# Patient Record
Sex: MALE | Race: WHITE | NOT HISPANIC OR LATINO | Employment: FULL TIME | URBAN - METROPOLITAN AREA
[De-identification: names, ages, dates, MRNs, and addresses within clinical notes are randomized per-mention and may not be internally consistent; named-entity substitution may affect disease eponyms.]

---

## 2017-09-12 ENCOUNTER — HOSPITAL ENCOUNTER (OUTPATIENT)
Facility: MEDICAL CENTER | Age: 57
End: 2017-09-14
Attending: EMERGENCY MEDICINE | Admitting: INTERNAL MEDICINE
Payer: COMMERCIAL

## 2017-09-12 ENCOUNTER — APPOINTMENT (OUTPATIENT)
Dept: RADIOLOGY | Facility: MEDICAL CENTER | Age: 57
End: 2017-09-12
Attending: EMERGENCY MEDICINE
Payer: COMMERCIAL

## 2017-09-12 ENCOUNTER — APPOINTMENT (OUTPATIENT)
Dept: RADIOLOGY | Facility: MEDICAL CENTER | Age: 57
End: 2017-09-12
Attending: INTERNAL MEDICINE
Payer: COMMERCIAL

## 2017-09-12 ENCOUNTER — RESOLUTE PROFESSIONAL BILLING HOSPITAL PROF FEE (OUTPATIENT)
Dept: HOSPITALIST | Facility: MEDICAL CENTER | Age: 57
End: 2017-09-12
Payer: COMMERCIAL

## 2017-09-12 PROBLEM — R07.9 CHEST PAIN: Status: ACTIVE | Noted: 2017-09-12

## 2017-09-12 LAB
ALBUMIN SERPL BCP-MCNC: 4 G/DL (ref 3.2–4.9)
ALBUMIN/GLOB SERPL: 1.6 G/DL
ALP SERPL-CCNC: 52 U/L (ref 30–99)
ALT SERPL-CCNC: 15 U/L (ref 2–50)
ANION GAP SERPL CALC-SCNC: 8 MMOL/L (ref 0–11.9)
APPEARANCE UR: CLEAR
AST SERPL-CCNC: 20 U/L (ref 12–45)
BASOPHILS # BLD AUTO: 0.4 % (ref 0–1.8)
BASOPHILS # BLD: 0.04 K/UL (ref 0–0.12)
BILIRUB SERPL-MCNC: 0.7 MG/DL (ref 0.1–1.5)
BILIRUB UR QL STRIP.AUTO: NEGATIVE
BUN SERPL-MCNC: 13 MG/DL (ref 8–22)
CALCIUM SERPL-MCNC: 9.4 MG/DL (ref 8.5–10.5)
CHLORIDE SERPL-SCNC: 106 MMOL/L (ref 96–112)
CO2 SERPL-SCNC: 24 MMOL/L (ref 20–33)
COLOR UR: YELLOW
CREAT SERPL-MCNC: 0.74 MG/DL (ref 0.5–1.4)
CULTURE IF INDICATED INDCX: NO UA CULTURE
EKG IMPRESSION: NORMAL
EOSINOPHIL # BLD AUTO: 0.01 K/UL (ref 0–0.51)
EOSINOPHIL NFR BLD: 0.1 % (ref 0–6.9)
ERYTHROCYTE [DISTWIDTH] IN BLOOD BY AUTOMATED COUNT: 41.3 FL (ref 35.9–50)
GFR SERPL CREATININE-BSD FRML MDRD: >60 ML/MIN/1.73 M 2
GLOBULIN SER CALC-MCNC: 2.5 G/DL (ref 1.9–3.5)
GLUCOSE SERPL-MCNC: 101 MG/DL (ref 65–99)
GLUCOSE UR STRIP.AUTO-MCNC: NEGATIVE MG/DL
HCT VFR BLD AUTO: 47.1 % (ref 42–52)
HGB BLD-MCNC: 15.8 G/DL (ref 14–18)
IMM GRANULOCYTES # BLD AUTO: 0.02 K/UL (ref 0–0.11)
IMM GRANULOCYTES NFR BLD AUTO: 0.2 % (ref 0–0.9)
KETONES UR STRIP.AUTO-MCNC: 40 MG/DL
LEUKOCYTE ESTERASE UR QL STRIP.AUTO: NEGATIVE
LIPASE SERPL-CCNC: 15 U/L (ref 11–82)
LYMPHOCYTES # BLD AUTO: 1.93 K/UL (ref 1–4.8)
LYMPHOCYTES NFR BLD: 17.6 % (ref 22–41)
MCH RBC QN AUTO: 29.8 PG (ref 27–33)
MCHC RBC AUTO-ENTMCNC: 33.5 G/DL (ref 33.7–35.3)
MCV RBC AUTO: 88.9 FL (ref 81.4–97.8)
MICRO URNS: ABNORMAL
MONOCYTES # BLD AUTO: 0.75 K/UL (ref 0–0.85)
MONOCYTES NFR BLD AUTO: 6.8 % (ref 0–13.4)
NEUTROPHILS # BLD AUTO: 8.2 K/UL (ref 1.82–7.42)
NEUTROPHILS NFR BLD: 74.9 % (ref 44–72)
NITRITE UR QL STRIP.AUTO: NEGATIVE
NRBC # BLD AUTO: 0 K/UL
NRBC BLD AUTO-RTO: 0 /100 WBC
PH UR STRIP.AUTO: 6 [PH]
PLATELET # BLD AUTO: 268 K/UL (ref 164–446)
PMV BLD AUTO: 10.8 FL (ref 9–12.9)
POTASSIUM SERPL-SCNC: 3.6 MMOL/L (ref 3.6–5.5)
PROT SERPL-MCNC: 6.5 G/DL (ref 6–8.2)
PROT UR QL STRIP: NEGATIVE MG/DL
RBC # BLD AUTO: 5.3 M/UL (ref 4.7–6.1)
RBC UR QL AUTO: NEGATIVE
SODIUM SERPL-SCNC: 138 MMOL/L (ref 135–145)
SP GR UR STRIP.AUTO: 1.02
TROPONIN I SERPL-MCNC: 0.01 NG/ML (ref 0–0.04)
TROPONIN I SERPL-MCNC: 0.02 NG/ML (ref 0–0.04)
UROBILINOGEN UR STRIP.AUTO-MCNC: 0.2 MG/DL
WBC # BLD AUTO: 11 K/UL (ref 4.8–10.8)

## 2017-09-12 PROCEDURE — 93005 ELECTROCARDIOGRAM TRACING: CPT | Performed by: INTERNAL MEDICINE

## 2017-09-12 PROCEDURE — 99407 BEHAV CHNG SMOKING > 10 MIN: CPT | Performed by: INTERNAL MEDICINE

## 2017-09-12 PROCEDURE — A9270 NON-COVERED ITEM OR SERVICE: HCPCS | Performed by: EMERGENCY MEDICINE

## 2017-09-12 PROCEDURE — 700111 HCHG RX REV CODE 636 W/ 250 OVERRIDE (IP): Performed by: EMERGENCY MEDICINE

## 2017-09-12 PROCEDURE — 93005 ELECTROCARDIOGRAM TRACING: CPT

## 2017-09-12 PROCEDURE — 81003 URINALYSIS AUTO W/O SCOPE: CPT

## 2017-09-12 PROCEDURE — 99218 PR INITIAL OBSERVATION CARE,LEVL I: CPT | Mod: 25 | Performed by: INTERNAL MEDICINE

## 2017-09-12 PROCEDURE — 96374 THER/PROPH/DIAG INJ IV PUSH: CPT

## 2017-09-12 PROCEDURE — 83690 ASSAY OF LIPASE: CPT

## 2017-09-12 PROCEDURE — 700102 HCHG RX REV CODE 250 W/ 637 OVERRIDE(OP): Performed by: INTERNAL MEDICINE

## 2017-09-12 PROCEDURE — 36415 COLL VENOUS BLD VENIPUNCTURE: CPT

## 2017-09-12 PROCEDURE — G0378 HOSPITAL OBSERVATION PER HR: HCPCS

## 2017-09-12 PROCEDURE — 93010 ELECTROCARDIOGRAM REPORT: CPT | Performed by: INTERNAL MEDICINE

## 2017-09-12 PROCEDURE — 96375 TX/PRO/DX INJ NEW DRUG ADDON: CPT

## 2017-09-12 PROCEDURE — A9270 NON-COVERED ITEM OR SERVICE: HCPCS | Performed by: INTERNAL MEDICINE

## 2017-09-12 PROCEDURE — 99285 EMERGENCY DEPT VISIT HI MDM: CPT

## 2017-09-12 PROCEDURE — 93005 ELECTROCARDIOGRAM TRACING: CPT | Performed by: EMERGENCY MEDICINE

## 2017-09-12 PROCEDURE — 74177 CT ABD & PELVIS W/CONTRAST: CPT

## 2017-09-12 PROCEDURE — 71020 DX-CHEST-2 VIEWS: CPT

## 2017-09-12 PROCEDURE — 700102 HCHG RX REV CODE 250 W/ 637 OVERRIDE(OP): Performed by: EMERGENCY MEDICINE

## 2017-09-12 PROCEDURE — 85025 COMPLETE CBC W/AUTO DIFF WBC: CPT

## 2017-09-12 PROCEDURE — 84484 ASSAY OF TROPONIN QUANT: CPT

## 2017-09-12 PROCEDURE — 80053 COMPREHEN METABOLIC PANEL: CPT

## 2017-09-12 RX ORDER — NICOTINE 21 MG/24HR
21 PATCH, TRANSDERMAL 24 HOURS TRANSDERMAL
Status: DISCONTINUED | OUTPATIENT
Start: 2017-09-13 | End: 2017-09-13

## 2017-09-12 RX ORDER — POLYETHYLENE GLYCOL 3350 17 G/17G
1 POWDER, FOR SOLUTION ORAL
Status: DISCONTINUED | OUTPATIENT
Start: 2017-09-12 | End: 2017-09-14 | Stop reason: HOSPADM

## 2017-09-12 RX ORDER — ASPIRIN 81 MG/1
324 TABLET, CHEWABLE ORAL ONCE
Status: COMPLETED | OUTPATIENT
Start: 2017-09-12 | End: 2017-09-12

## 2017-09-12 RX ORDER — PROMETHAZINE HYDROCHLORIDE 25 MG/1
12.5-25 TABLET ORAL EVERY 4 HOURS PRN
Status: DISCONTINUED | OUTPATIENT
Start: 2017-09-12 | End: 2017-09-14 | Stop reason: HOSPADM

## 2017-09-12 RX ORDER — PROMETHAZINE HYDROCHLORIDE 25 MG/1
12.5-25 SUPPOSITORY RECTAL EVERY 4 HOURS PRN
Status: DISCONTINUED | OUTPATIENT
Start: 2017-09-12 | End: 2017-09-14 | Stop reason: HOSPADM

## 2017-09-12 RX ORDER — MORPHINE SULFATE 4 MG/ML
2 INJECTION, SOLUTION INTRAMUSCULAR; INTRAVENOUS
Status: DISCONTINUED | OUTPATIENT
Start: 2017-09-12 | End: 2017-09-14 | Stop reason: HOSPADM

## 2017-09-12 RX ORDER — OXYCODONE HYDROCHLORIDE 5 MG/1
5 TABLET ORAL
Status: DISCONTINUED | OUTPATIENT
Start: 2017-09-12 | End: 2017-09-14 | Stop reason: HOSPADM

## 2017-09-12 RX ORDER — ONDANSETRON 2 MG/ML
4 INJECTION INTRAMUSCULAR; INTRAVENOUS EVERY 4 HOURS PRN
Status: DISCONTINUED | OUTPATIENT
Start: 2017-09-12 | End: 2017-09-14 | Stop reason: HOSPADM

## 2017-09-12 RX ORDER — AMOXICILLIN 250 MG
2 CAPSULE ORAL 2 TIMES DAILY
Status: DISCONTINUED | OUTPATIENT
Start: 2017-09-13 | End: 2017-09-14 | Stop reason: HOSPADM

## 2017-09-12 RX ORDER — BISACODYL 10 MG
10 SUPPOSITORY, RECTAL RECTAL
Status: DISCONTINUED | OUTPATIENT
Start: 2017-09-12 | End: 2017-09-14 | Stop reason: HOSPADM

## 2017-09-12 RX ORDER — ASPIRIN 300 MG/1
300 SUPPOSITORY RECTAL DAILY
Status: DISCONTINUED | OUTPATIENT
Start: 2017-09-13 | End: 2017-09-14 | Stop reason: HOSPADM

## 2017-09-12 RX ORDER — OMEPRAZOLE 20 MG/1
20 CAPSULE, DELAYED RELEASE ORAL DAILY
Status: DISCONTINUED | OUTPATIENT
Start: 2017-09-13 | End: 2017-09-14 | Stop reason: HOSPADM

## 2017-09-12 RX ORDER — DEXTROSE MONOHYDRATE 25 G/50ML
25 INJECTION, SOLUTION INTRAVENOUS
Status: DISCONTINUED | OUTPATIENT
Start: 2017-09-12 | End: 2017-09-14 | Stop reason: HOSPADM

## 2017-09-12 RX ORDER — MORPHINE SULFATE 4 MG/ML
4 INJECTION, SOLUTION INTRAMUSCULAR; INTRAVENOUS ONCE
Status: COMPLETED | OUTPATIENT
Start: 2017-09-12 | End: 2017-09-12

## 2017-09-12 RX ORDER — ACETAMINOPHEN 325 MG/1
650 TABLET ORAL EVERY 6 HOURS PRN
Status: DISCONTINUED | OUTPATIENT
Start: 2017-09-12 | End: 2017-09-14 | Stop reason: HOSPADM

## 2017-09-12 RX ORDER — OXYCODONE HYDROCHLORIDE 5 MG/1
2.5 TABLET ORAL
Status: DISCONTINUED | OUTPATIENT
Start: 2017-09-12 | End: 2017-09-14 | Stop reason: HOSPADM

## 2017-09-12 RX ORDER — ASPIRIN 325 MG
325 TABLET ORAL DAILY
Status: DISCONTINUED | OUTPATIENT
Start: 2017-09-13 | End: 2017-09-14 | Stop reason: HOSPADM

## 2017-09-12 RX ORDER — ONDANSETRON 4 MG/1
4 TABLET, ORALLY DISINTEGRATING ORAL EVERY 4 HOURS PRN
Status: DISCONTINUED | OUTPATIENT
Start: 2017-09-12 | End: 2017-09-14 | Stop reason: HOSPADM

## 2017-09-12 RX ORDER — ASPIRIN 81 MG/1
324 TABLET, CHEWABLE ORAL DAILY
Status: DISCONTINUED | OUTPATIENT
Start: 2017-09-13 | End: 2017-09-14 | Stop reason: HOSPADM

## 2017-09-12 RX ORDER — ONDANSETRON 2 MG/ML
4 INJECTION INTRAMUSCULAR; INTRAVENOUS ONCE
Status: COMPLETED | OUTPATIENT
Start: 2017-09-12 | End: 2017-09-12

## 2017-09-12 RX ADMIN — ONDANSETRON 4 MG: 2 INJECTION INTRAMUSCULAR; INTRAVENOUS at 16:39

## 2017-09-12 RX ADMIN — LIDOCAINE HYDROCHLORIDE 30 ML: 20 SOLUTION OROPHARYNGEAL at 21:54

## 2017-09-12 RX ADMIN — MORPHINE SULFATE 4 MG: 4 INJECTION INTRAVENOUS at 16:38

## 2017-09-12 RX ADMIN — ASPIRIN 324 MG: 81 TABLET, CHEWABLE ORAL at 16:38

## 2017-09-12 ASSESSMENT — LIFESTYLE VARIABLES
TOTAL SCORE: 0
TOTAL SCORE: 0
REASON UNABLE TO ASSESS: 0
CONSUMPTION TOTAL: INCOMPLETE
EVER FELT BAD OR GUILTY ABOUT YOUR DRINKING: NO
TOTAL SCORE: 0
TOTAL SCORE: 0
DO YOU DRINK ALCOHOL: YES
HAVE PEOPLE ANNOYED YOU BY CRITICIZING YOUR DRINKING: NO
TOTAL SCORE: 0
EVER HAD A DRINK FIRST THING IN THE MORNING TO STEADY YOUR NERVES TO GET RID OF A HANGOVER: NO
DO YOU DRINK ALCOHOL: NO
HAVE PEOPLE ANNOYED YOU BY CRITICIZING YOUR DRINKING: NO
EVER FELT BAD OR GUILTY ABOUT YOUR DRINKING: NO
EVER HAD A DRINK FIRST THING IN THE MORNING TO STEADY YOUR NERVES TO GET RID OF A HANGOVER: NO
HAVE YOU EVER FELT YOU SHOULD CUT DOWN ON YOUR DRINKING: NO
CONSUMPTION TOTAL: INCOMPLETE
TOTAL SCORE: 0
HAVE YOU EVER FELT YOU SHOULD CUT DOWN ON YOUR DRINKING: NO

## 2017-09-12 ASSESSMENT — PATIENT HEALTH QUESTIONNAIRE - PHQ9
SUM OF ALL RESPONSES TO PHQ QUESTIONS 1-9: 0
1. LITTLE INTEREST OR PLEASURE IN DOING THINGS: NOT AT ALL
2. FEELING DOWN, DEPRESSED, IRRITABLE, OR HOPELESS: NOT AT ALL
SUM OF ALL RESPONSES TO PHQ9 QUESTIONS 1 AND 2: 0

## 2017-09-12 ASSESSMENT — PAIN SCALES - GENERAL
PAINLEVEL_OUTOF10: 0
PAINLEVEL_OUTOF10: 0
PAINLEVEL_OUTOF10: 8
PAINLEVEL_OUTOF10: 0

## 2017-09-12 ASSESSMENT — PAIN SCALES - WONG BAKER
WONGBAKER_NUMERICALRESPONSE: DOESN'T HURT AT ALL

## 2017-09-12 NOTE — ED PROVIDER NOTES
ED Provider Note    Scribed for Bean Mckeon M.D. by Brunilda Fair. 9/12/2017, 4:13 PM.    Primary care provider: None reported.  Means of arrival: walk-in  History obtained from: Patient  History limited by: none     CHIEF COMPLAINT  Chief Complaint   Patient presents with   • Chest Pain     Developed pain while in the lobby, no cardiac history, ekg done in triage, non radiating, epigastric   • Abdominal Pain     X3 days, hx prostatitis, diffuse in nature     HPI  Bernardo Pickering is a 56 y.o. male who presents to the Emergency Department primarily for evaluation of groin pain onset three days ago. Pain is located to left side of groin. Patient reports history of prostatitis and hernia repair on his left side. Patient states he finished antibiotics for prostatitis four week ago. He states associated chest pain, indigestion, loss of appetite, tongue swelling, and headache at this time. He describes his chest pain as burning and intermittent in nature. Pain is located to the center of chest. Patient also describes headache is throbbing in nature. Denies fever, cough, sore throat, dysuria, or vomiting. Denies exacerbating factors of chest pain. Patient reports history of emphysema. He smokes a pack of cigarettes per day. Denies primary care at this time.     REVIEW OF SYSTEMS  Patient reports groin pain, chest pain, indigestion, loss of appetite, tongue swelling, and headache.  He denies fever, vision change, sore throat, cough, dysuria, vomiting, focal muscle pain, or neurologic deficits. C    PAST MEDICAL HISTORY   Patient reports history of prostatis    SURGICAL HISTORY  patient denies any surgical history    SOCIAL HISTORY  Social History   Substance Use Topics   • Smoking status: Current Every Day Smoker     Packs/day: 1.00     Types: Cigarettes     Start date: 9/12/1997   • Alcohol use Yes      History   Drug Use No     FAMILY HISTORY  Patient does not report pertinent family history     CURRENT  "MEDICATIONS  Reviewed. See Encounter Summary.     ALLERGIES  Allergies   Allergen Reactions   • Prednisone    • Sulfa Drugs      PHYSICAL EXAM  VITAL SIGNS: /75   Pulse 96   Temp 37.4 °C (99.3 °F)   Resp 16   Ht 1.753 m (5' 9\")   Wt 72.7 kg (160 lb 4.4 oz)   SpO2 95%   BMI 23.67 kg/m²    Pulse ox interpretation: I interpret this pulse ox as normal.  Constitutional: Alert in no apparent distress.  HENT: Head normocephalic, atraumatic, Bilateral external ears normal, Nose normal.  Eyes: Pupils are equal, extraocular movements intact, Conjunctiva normal, Non-icteric.   Neck: Normal range of motion, Supple, No stridor.   Lymphatic: No lymphadenopathy noted.   Cardiovascular: Regular rate and systolic murmur grade 3/5   Thorax & Lungs: No acute respiratory distress, No wheezing, No increased work of breathing.   Abdomen: Soft, prominent descending aorta, right upper quadrant tenderness to palpation with mild guarding, Non-distended, No peritoneal signs.  Skin: Warm, Dry, No erythema, No rash.   Back: No bony tenderness, No CVA tenderness.   Extremities: Intact distal pulses, No edema, No tenderness, No cyanosis.    Musculoskeletal: Good range of motion in all major joints. No tenderness to palpation or major deformities noted.   Neurologic: Alert , Normal motor function, Normal sensory function, No focal deficits noted.   Psychiatric: Affect normal, Judgment normal, Mood normal.     DIAGNOSTIC STUDIES / PROCEDURES     LABS  Results for orders placed or performed during the hospital encounter of 09/12/17   COMP METABOLIC PANEL   Result Value Ref Range    Sodium 138 135 - 145 mmol/L    Potassium 3.6 3.6 - 5.5 mmol/L    Chloride 106 96 - 112 mmol/L    Co2 24 20 - 33 mmol/L    Anion Gap 8.0 0.0 - 11.9    Glucose 101 (H) 65 - 99 mg/dL    Bun 13 8 - 22 mg/dL    Creatinine 0.74 0.50 - 1.40 mg/dL    Calcium 9.4 8.5 - 10.5 mg/dL    AST(SGOT) 20 12 - 45 U/L    ALT(SGPT) 15 2 - 50 U/L    Alkaline Phosphatase 52 30 - 99 " U/L    Total Bilirubin 0.7 0.1 - 1.5 mg/dL    Albumin 4.0 3.2 - 4.9 g/dL    Total Protein 6.5 6.0 - 8.2 g/dL    Globulin 2.5 1.9 - 3.5 g/dL    A-G Ratio 1.6 g/dL   LIPASE   Result Value Ref Range    Lipase 15 11 - 82 U/L   ESTIMATED GFR   Result Value Ref Range    GFR If African American >60 >60 mL/min/1.73 m 2    GFR If Non African American >60 >60 mL/min/1.73 m 2   EKG (ER)   Result Value Ref Range    Report       [facility] Emergency Dept.    Test Date:  2017  Pt Name:    YOUNG GAYLE                  Department: ER  MRN:        [id number]                      Room:  Gender:     M                            Technician: [id number]  :        1960                   Requested By:ER TRIAGE PROTOCOL  Order #:    [id number]                    Reading MD:    Measurements  Intervals                                Axis  Rate:       91                           P:          71  LA:         148                          QRS:        6  QRSD:       98                           T:          47  QT:         364  QTc:        448    Interpretive Statements  SINUS TACHYCARDIA  MULTIPLE ATRIAL PREMATURE COMPLEXES  LEFT ATRIAL ABNORMALITY  LEFT VENTRICULAR HYPERTROPHY  ST DEPRESSION, CONSIDER ISCHEMIA, ANT-LAT LDS  No previous ECG available for comparison     EKG (ER)   Result Value Ref Range    Report       [facility] Emergency Dept.    Test Date:  2017  Pt Name:    YOUNG GAYLE                  Department: ER  MRN:        [id number]                      Room:        34  Gender:     M                            Technician: [id number]  :        1960                   Requested By:CHERYL BLAKE  Order #:    [id number]                    Reading MD:    Measurements  Intervals                                Axis  Rate:       86                           P:          70  LA:         152                          QRS:        33  QRSD:       96                           T:          64  QT:          372  QTc:        445    Interpretive Statements  SINUS RHYTHM  MULTIPLE ATRIAL PREMATURE COMPLEXES  LEFT ATRIAL ABNORMALITY  RSR' IN V1 OR V2, PROBABLY NORMAL VARIANT  LEFT VENTRICULAR HYPERTROPHY  ST DEPRESSION, CONSIDER ISCHEMIA, LAT LEADS  Compared to ECG 09/12/2017 15:55:06  RSR' in V1 or V2 now present  Sinus tachycardia no longer present  ST (T wave) deviation still pr esent  Possible ischemia still present       All labs were reviewed by me.    EKG  12 Lead EKG interpreted by me to show:  Normal sinus rhythm  Rate 86  Multiple Atrial complexes   borderline ST segment depression v4- v6  Impression: Multiple PAC's with ST segment depression in lateral leads      RADIOLOGY  CT-ABDOMEN-PELVIS WITH   Final Result      Moderate colonic diverticulosis without evidence of diverticulitis      Cholecystectomy without biliary dilatation      Left renal and hepatic cysts      NM-CARDIAC STRESS TEST    (Results Pending)   DX-CHEST-2 VIEWS    (Results Pending)     The radiologist's interpretation of all radiological studies have been reviewed by me.    COURSE & MEDICAL DECISION MAKING  Pertinent Labs & Imaging studies reviewed. (See chart for details)    4:13 PM - Patient seen and examined at bedside. Patient is here with right upper quadrant tenderness. No peritoneal signs. Patient will be treated with Zofran injection 4 mg, Morphine 4 mg/ml, and Aspirin 324 mg. Ordered CT-Abdomen-Pelvis, CBC with differential, CMP, Troponin, Lipase, Urinalysis, EKG to evaluate his symptoms. Eunice hospitalist.     5:58 PM I discussed the patient's case and the above findings with Dr. Saunders (hospitalist) who agrees to admit patient.     Decision Making:  This is a 56 y.o. year old male who presents with intermittent chest pain, and abdominal pain. The patient states that he was diagnosed with prostatitis in the past month, states that he finished antibiotics approximately 2 weeks ago, and his continued have some similar type  symptoms. He does describe some pain with defecation, and in addition to this, ongoing abdominal pain, and nausea. The patient states that he's been having chest pain, and here on examination the patient has a fairly normal exam but considerations included diverticulitis, prostatitis, acute coronary syndrome, and costochondritis. Laboratory analysis here shows normal troponins, and EKG shows no ST segment elevation but does have some depressions in the lateral leads. The patient's story is of only moderate suggestion of acute MI however the patient's EKG does have some concerning findings. The patient does not have a previous one to compare to, and given the patient's EKG abnormalities, I think he would likely benefit from admission for further troponin turning, and provocative testing. Regarding the patient's abdominal pain, CT scan here shows no evidence of significant intra-abdominal abnormality. Additionally, patient has normal laboratory analyses here, and showing no evidence of left foot abnormalities or other evidence of end organ dysfunction. Given this, platelet admit the patient for further troponin trending and provocative testing on an inpatient basis    DISPOSITION:  Patient will be admitted to Dr Saunders in guarded condition.    FINAL IMPRESSION  1. Chest pain, other  2. Intermittent abdominal pain, generalized      Brunilda RENO (Scribe), am scribing for, and in the presence of, Bean Mckeon M.D..    Electronically signed by: Brunilda Fair (Scribe), 9/12/2017    Bean RENO M.D. personally performed the services described in this documentation, as scribed by Brunilda Fair in my presence, and it is both accurate and complete.    The note accurately reflects work and decisions made by me.  Bean Mckeon  9/12/2017  11:09 PM

## 2017-09-12 NOTE — ED NOTES
Bernardo Pickering  56 y.o.  Chief Complaint   Patient presents with   • Chest Pain     Developed pain while in the lobby, no cardiac history, ekg done in triage, non radiating, epigastric   • Abdominal Pain     X3 days, hx prostatitis, diffuse in nature     ERP at bedside, second ekg done

## 2017-09-13 ENCOUNTER — APPOINTMENT (OUTPATIENT)
Dept: RADIOLOGY | Facility: MEDICAL CENTER | Age: 57
End: 2017-09-13
Attending: INTERNAL MEDICINE
Payer: COMMERCIAL

## 2017-09-13 PROBLEM — Z72.0 TOBACCO ABUSE: Chronic | Status: ACTIVE | Noted: 2017-09-13

## 2017-09-13 PROBLEM — R07.9 CHEST PAIN: Status: RESOLVED | Noted: 2017-09-12 | Resolved: 2017-09-13

## 2017-09-13 PROBLEM — Z87.438 HISTORY OF PROSTATITIS: Status: RESOLVED | Noted: 2017-09-13 | Resolved: 2017-09-13

## 2017-09-13 PROBLEM — J96.01 ACUTE RESPIRATORY FAILURE WITH HYPOXIA (HCC): Status: ACTIVE | Noted: 2017-09-13

## 2017-09-13 PROBLEM — R00.1 SINUS BRADYCARDIA: Status: ACTIVE | Noted: 2017-09-13

## 2017-09-13 PROBLEM — J43.9 EMPHYSEMA OF LUNG (HCC): Chronic | Status: ACTIVE | Noted: 2017-09-13

## 2017-09-13 PROBLEM — N41.9 PROSTATITIS: Status: ACTIVE | Noted: 2017-09-13

## 2017-09-13 PROBLEM — R10.32 LEFT LOWER QUADRANT PAIN: Status: ACTIVE | Noted: 2017-09-13

## 2017-09-13 PROBLEM — R10.32 LEFT LOWER QUADRANT PAIN: Status: RESOLVED | Noted: 2017-09-13 | Resolved: 2017-09-13

## 2017-09-13 PROBLEM — I34.1 MITRAL VALVE PROLAPSE: Status: ACTIVE | Noted: 2017-09-13

## 2017-09-13 PROBLEM — Z72.0 TOBACCO ABUSE: Status: ACTIVE | Noted: 2017-09-13

## 2017-09-13 PROBLEM — J96.01 ACUTE RESPIRATORY FAILURE WITH HYPOXIA (HCC): Status: RESOLVED | Noted: 2017-09-13 | Resolved: 2017-09-13

## 2017-09-13 PROBLEM — Z87.438 HISTORY OF PROSTATITIS: Status: ACTIVE | Noted: 2017-09-13

## 2017-09-13 PROBLEM — J43.9 EMPHYSEMA OF LUNG (HCC): Status: ACTIVE | Noted: 2017-09-13

## 2017-09-13 LAB
ANION GAP SERPL CALC-SCNC: 5 MMOL/L (ref 0–11.9)
BASOPHILS # BLD AUTO: 0.3 % (ref 0–1.8)
BASOPHILS # BLD: 0.03 K/UL (ref 0–0.12)
BUN SERPL-MCNC: 14 MG/DL (ref 8–22)
CALCIUM SERPL-MCNC: 9 MG/DL (ref 8.5–10.5)
CHLORIDE SERPL-SCNC: 108 MMOL/L (ref 96–112)
CO2 SERPL-SCNC: 27 MMOL/L (ref 20–33)
CREAT SERPL-MCNC: 0.89 MG/DL (ref 0.5–1.4)
EKG IMPRESSION: NORMAL
EKG IMPRESSION: NORMAL
EOSINOPHIL # BLD AUTO: 0.05 K/UL (ref 0–0.51)
EOSINOPHIL NFR BLD: 0.5 % (ref 0–6.9)
ERYTHROCYTE [DISTWIDTH] IN BLOOD BY AUTOMATED COUNT: 42.1 FL (ref 35.9–50)
GFR SERPL CREATININE-BSD FRML MDRD: >60 ML/MIN/1.73 M 2
GLUCOSE SERPL-MCNC: 104 MG/DL (ref 65–99)
HCT VFR BLD AUTO: 43.6 % (ref 42–52)
HGB BLD-MCNC: 14.6 G/DL (ref 14–18)
IMM GRANULOCYTES # BLD AUTO: 0.03 K/UL (ref 0–0.11)
IMM GRANULOCYTES NFR BLD AUTO: 0.3 % (ref 0–0.9)
LV EJECT FRACT  99904: 75
LV EJECT FRACT MOD 2C 99903: 88.75
LV EJECT FRACT MOD 4C 99902: 81.27
LV EJECT FRACT MOD BP 99901: 82.56
LYMPHOCYTES # BLD AUTO: 1.99 K/UL (ref 1–4.8)
LYMPHOCYTES NFR BLD: 21.4 % (ref 22–41)
MCH RBC QN AUTO: 30.5 PG (ref 27–33)
MCHC RBC AUTO-ENTMCNC: 33.5 G/DL (ref 33.7–35.3)
MCV RBC AUTO: 91 FL (ref 81.4–97.8)
MONOCYTES # BLD AUTO: 0.68 K/UL (ref 0–0.85)
MONOCYTES NFR BLD AUTO: 7.3 % (ref 0–13.4)
NEUTROPHILS # BLD AUTO: 6.54 K/UL (ref 1.82–7.42)
NEUTROPHILS NFR BLD: 70.2 % (ref 44–72)
NRBC # BLD AUTO: 0 K/UL
NRBC BLD AUTO-RTO: 0 /100 WBC
PLATELET # BLD AUTO: 227 K/UL (ref 164–446)
PMV BLD AUTO: 10.7 FL (ref 9–12.9)
POTASSIUM SERPL-SCNC: 3.8 MMOL/L (ref 3.6–5.5)
RBC # BLD AUTO: 4.79 M/UL (ref 4.7–6.1)
SODIUM SERPL-SCNC: 140 MMOL/L (ref 135–145)
TROPONIN I SERPL-MCNC: 0.01 NG/ML (ref 0–0.04)
WBC # BLD AUTO: 9.3 K/UL (ref 4.8–10.8)

## 2017-09-13 PROCEDURE — 700102 HCHG RX REV CODE 250 W/ 637 OVERRIDE(OP): Performed by: NURSE PRACTITIONER

## 2017-09-13 PROCEDURE — 93306 TTE W/DOPPLER COMPLETE: CPT | Mod: 26 | Performed by: INTERNAL MEDICINE

## 2017-09-13 PROCEDURE — 700102 HCHG RX REV CODE 250 W/ 637 OVERRIDE(OP): Performed by: INTERNAL MEDICINE

## 2017-09-13 PROCEDURE — 700111 HCHG RX REV CODE 636 W/ 250 OVERRIDE (IP): Performed by: INTERNAL MEDICINE

## 2017-09-13 PROCEDURE — 700111 HCHG RX REV CODE 636 W/ 250 OVERRIDE (IP)

## 2017-09-13 PROCEDURE — 700105 HCHG RX REV CODE 258: Performed by: INTERNAL MEDICINE

## 2017-09-13 PROCEDURE — G0378 HOSPITAL OBSERVATION PER HR: HCPCS

## 2017-09-13 PROCEDURE — A9502 TC99M TETROFOSMIN: HCPCS

## 2017-09-13 PROCEDURE — A9270 NON-COVERED ITEM OR SERVICE: HCPCS | Performed by: NURSE PRACTITIONER

## 2017-09-13 PROCEDURE — A9270 NON-COVERED ITEM OR SERVICE: HCPCS | Performed by: INTERNAL MEDICINE

## 2017-09-13 PROCEDURE — 93005 ELECTROCARDIOGRAM TRACING: CPT | Performed by: INTERNAL MEDICINE

## 2017-09-13 PROCEDURE — 85025 COMPLETE CBC W/AUTO DIFF WBC: CPT

## 2017-09-13 PROCEDURE — 93306 TTE W/DOPPLER COMPLETE: CPT

## 2017-09-13 PROCEDURE — 80048 BASIC METABOLIC PNL TOTAL CA: CPT

## 2017-09-13 PROCEDURE — 96376 TX/PRO/DX INJ SAME DRUG ADON: CPT

## 2017-09-13 PROCEDURE — 84484 ASSAY OF TROPONIN QUANT: CPT

## 2017-09-13 PROCEDURE — 93010 ELECTROCARDIOGRAM REPORT: CPT | Performed by: INTERNAL MEDICINE

## 2017-09-13 PROCEDURE — 87086 URINE CULTURE/COLONY COUNT: CPT

## 2017-09-13 PROCEDURE — 36415 COLL VENOUS BLD VENIPUNCTURE: CPT

## 2017-09-13 PROCEDURE — 99225 PR SUBSEQUENT OBSERVATION CARE,LEVEL II: CPT | Performed by: HOSPITALIST

## 2017-09-13 RX ORDER — CALCIUM CARBONATE 500 MG/1
500 TABLET, CHEWABLE ORAL
Status: DISCONTINUED | OUTPATIENT
Start: 2017-09-13 | End: 2017-09-13

## 2017-09-13 RX ORDER — CIPROFLOXACIN 500 MG/1
500 TABLET, FILM COATED ORAL EVERY 12 HOURS
Status: DISCONTINUED | OUTPATIENT
Start: 2017-09-13 | End: 2017-09-14 | Stop reason: HOSPADM

## 2017-09-13 RX ORDER — AMINOPHYLLINE 25 MG/ML
INJECTION, SOLUTION INTRAVENOUS
Status: COMPLETED
Start: 2017-09-13 | End: 2017-09-13

## 2017-09-13 RX ORDER — SODIUM CHLORIDE 9 MG/ML
500 INJECTION, SOLUTION INTRAVENOUS ONCE
Status: COMPLETED | OUTPATIENT
Start: 2017-09-13 | End: 2017-09-13

## 2017-09-13 RX ORDER — CALCIUM CARBONATE 500 MG/1
500 TABLET, CHEWABLE ORAL
Status: DISCONTINUED | OUTPATIENT
Start: 2017-09-13 | End: 2017-09-14 | Stop reason: HOSPADM

## 2017-09-13 RX ORDER — ACETAMINOPHEN 325 MG/1
650 TABLET ORAL EVERY 6 HOURS PRN
Qty: 30 TAB | Refills: 0 | COMMUNITY
Start: 2017-09-13

## 2017-09-13 RX ORDER — CALCIUM CARBONATE 500 MG/1
1000 TABLET, CHEWABLE ORAL
Status: DISCONTINUED | OUTPATIENT
Start: 2017-09-13 | End: 2017-09-14 | Stop reason: HOSPADM

## 2017-09-13 RX ORDER — REGADENOSON 0.08 MG/ML
INJECTION, SOLUTION INTRAVENOUS
Status: COMPLETED
Start: 2017-09-13 | End: 2017-09-13

## 2017-09-13 RX ORDER — CALCIUM CARBONATE 500 MG/1
1000 TABLET, CHEWABLE ORAL
Status: DISCONTINUED | OUTPATIENT
Start: 2017-09-13 | End: 2017-09-13

## 2017-09-13 RX ORDER — CIPROFLOXACIN 500 MG/1
500 TABLET, FILM COATED ORAL EVERY 12 HOURS
Qty: 42 TAB | Refills: 0 | Status: SHIPPED | OUTPATIENT
Start: 2017-09-13 | End: 2017-10-04

## 2017-09-13 RX ORDER — OXYCODONE HYDROCHLORIDE 5 MG/1
5 TABLET ORAL EVERY 6 HOURS PRN
Qty: 15 TAB | Refills: 0 | Status: SHIPPED | OUTPATIENT
Start: 2017-09-13

## 2017-09-13 RX ADMIN — ANTACID TABLETS 1000 MG: 500 TABLET, CHEWABLE ORAL at 01:31

## 2017-09-13 RX ADMIN — CIPROFLOXACIN HYDROCHLORIDE 500 MG: 500 TABLET, FILM COATED ORAL at 21:42

## 2017-09-13 RX ADMIN — STANDARDIZED SENNA CONCENTRATE AND DOCUSATE SODIUM 2 TABLET: 8.6; 5 TABLET, FILM COATED ORAL at 21:42

## 2017-09-13 RX ADMIN — CIPROFLOXACIN HYDROCHLORIDE 500 MG: 500 TABLET, FILM COATED ORAL at 10:23

## 2017-09-13 RX ADMIN — REGADENOSON 0.4 MG: 0.08 INJECTION, SOLUTION INTRAVENOUS at 09:31

## 2017-09-13 RX ADMIN — AMINOPHYLLINE 100 MG: 25 INJECTION, SOLUTION INTRAVENOUS at 10:02

## 2017-09-13 RX ADMIN — POLYETHYLENE GLYCOL 3350 1 PACKET: 17 POWDER, FOR SOLUTION ORAL at 15:32

## 2017-09-13 RX ADMIN — OMEPRAZOLE 20 MG: 20 CAPSULE, DELAYED RELEASE ORAL at 06:36

## 2017-09-13 RX ADMIN — SODIUM CHLORIDE 500 ML: 9 INJECTION, SOLUTION INTRAVENOUS at 13:15

## 2017-09-13 RX ADMIN — ONDANSETRON 4 MG: 2 INJECTION INTRAMUSCULAR; INTRAVENOUS at 08:07

## 2017-09-13 RX ADMIN — OXYCODONE HYDROCHLORIDE 5 MG: 5 TABLET ORAL at 08:07

## 2017-09-13 RX ADMIN — OXYCODONE HYDROCHLORIDE 5 MG: 5 TABLET ORAL at 02:59

## 2017-09-13 RX ADMIN — NICOTINE 7 MG: 7 PATCH TRANSDERMAL at 10:23

## 2017-09-13 RX ADMIN — ASPIRIN 325 MG: 325 TABLET, COATED ORAL at 10:23

## 2017-09-13 ASSESSMENT — ENCOUNTER SYMPTOMS
DIZZINESS: 0
HEADACHES: 0
PALPITATIONS: 0
SHORTNESS OF BREATH: 1
DIARRHEA: 0
NAUSEA: 1
WHEEZING: 0
ABDOMINAL PAIN: 1
ABDOMINAL PAIN: 0
FEVER: 0
VOMITING: 0
NAUSEA: 0
BACK PAIN: 0
COUGH: 0
CHILLS: 0
SPUTUM PRODUCTION: 0

## 2017-09-13 ASSESSMENT — PAIN SCALES - GENERAL
PAINLEVEL_OUTOF10: 7
PAINLEVEL_OUTOF10: 0

## 2017-09-13 ASSESSMENT — COPD QUESTIONNAIRES
COPD SCREENING SCORE: 3
DO YOU EVER COUGH UP ANY MUCUS OR PHLEGM?: NO/ONLY WITH OCCASIONAL COLDS OR INFECTIONS
DURING THE PAST 4 WEEKS HOW MUCH DID YOU FEEL SHORT OF BREATH: NONE/LITTLE OF THE TIME
HAVE YOU SMOKED AT LEAST 100 CIGARETTES IN YOUR ENTIRE LIFE: YES

## 2017-09-13 ASSESSMENT — PAIN SCALES - WONG BAKER
WONGBAKER_NUMERICALRESPONSE: DOESN'T HURT AT ALL

## 2017-09-13 ASSESSMENT — LIFESTYLE VARIABLES: EVER_SMOKED: YES

## 2017-09-13 NOTE — ASSESSMENT & PLAN NOTE
Patient does not complete 6 week course of Bactrim as prescribed due to being unable to tolerate it.  To ensure complete eradication I will start the patient on ciprofloxacin 500 mg twice a day for total of 3 weeks to complete a total course of 6 weeks of antibiotics  I will also send for a repeat urine culture

## 2017-09-13 NOTE — PROGRESS NOTES
Received from green [facility], aox4, sb  on monitor, steady on his feet. Denies pain or sob. Call light within reach. Needs attended. Plan of care discussed and understood.

## 2017-09-13 NOTE — PROGRESS NOTES
"Pt continues to complain of pain; Complains that the medications make him feel in a fog; RN ask if pt would like something different for discomfort; Pt replies,\" well I don't know.\"  Pt unable to give answers. Just states,\" Well I don't know what I want.\" RN rec pt to think about it. Will continue to monitor.   "

## 2017-09-13 NOTE — PROGRESS NOTES
Pt verbalized want to stay for [facility] in the AM. Daughter arranging flight home following discharge.

## 2017-09-13 NOTE — PROGRESS NOTES
"With complaints of abdominal pain, refuses any more pain med, claimed \" I'm too drugged up.\", explained that I only gave him 1 dose of oxycodone, still refused  Any more meds.  "

## 2017-09-13 NOTE — ASSESSMENT & PLAN NOTE
CT abdomen and pelvis reveals moderate diverticulosis without evidence of diverticulitis, left renal and hepatic cysts  We will provide the patient with pain control

## 2017-09-13 NOTE — CONSULTS
Cardiology Consult Note:    Samia Santana  Date of Service:    9/13/2017   12:28 PM     Referring MD:  Dr. Saunders    Patient ID:   Name:             Bernardo Pickering     YOB: 1960  Age:                 56 y.o.  male   MRN:               [id number]                                                             Chief Complaint:      MVP; severe MR    History of Present Illness:    57 y/o male out of cristel from [place] to participate in air show in [place] c/o feeling extremely tiredness and thirsty x 1-2 ds; who presented 9/12/2017 with left lower abdominal pain for the past 3 days; Also vague CP x 1 wkwith cardiac eval; H/o Prostatis; Abn studies showed below:     9/13/2017 Echo :No prior study is available for comparison.   Hyperdynamic left ventricular systolic function with dynamic LVOT obstruction related to systolic anterior motion of the mitral leaflets.  Moderate concentric left ventricular hypertrophy.  Left ventricular ejection fraction is visually estimated to be greater than 75%.  Significant bileaflet mitral valve prolapse with resultant systolic anterior motion of the anterior mitral valve leaflet (RAMAKRISHNA).  Severe mitral regurgitation.  Estimated right ventricular systolic pressure is 45 mmHg.  Critical findings communicated to care team at time of reading.    9.12.2017 NUCLEAR IMAGING INTERPRETATION   No evidence of significant jeopardized viable myocardium or prior myocardial    infarction.   Normal left ventricular size, ejection fraction, and wall motion.      CT abd 9/12/2018: Moderate colonic diverticulosis without evidence of diverticulitis  Cholecystectomy without biliary dilatation  Left renal and hepatic cysts      Review of Systems:      Constitutional: Denies fevers, Denies weight changes  Eyes: Denies changes in vision, no eye pain  Ears/Nose/Throat/Mouth: Denies nasal congestion or sore throat   Cardiovascular: -+chest pain, - palpitations   Respiratory: + shortness of breath , Denies  cough  Gastrointestinal/Hepatic:  abdominal pain, nausea, vomiting, diarrhea, constipation or GI bleeding   Genitourinary: Denies dysuria or frequency  Musculoskeletal/Rheum: Denies  joint pain and swelling   Skin: Denies rash  Neurological: Denies headache, confusion, memory loss or focal weakness/parasthesias  Psychiatric: denies mood disorder   Endocrine: Crista thyroid problems  Heme/Oncology/Lymph Nodes: Denies enlarged lymph nodes, denies brusing or known bleeding disorder  All other systems were reviewed and are negative (AMA/CMS criteria)                Past Medical History:   Past Medical History:   Diagnosis Date   • Emphysema lung (CMS-HCC)    • Inguinal hernia    • Mitral valve disorder    • Prostatitis      Active Hospital Problems    Diagnosis   • History of prostatitis [Z87.438]   • Left lower quadrant pain [R10.32]   • Tobacco abuse [Z72.0]   • Emphysema of lung (CMS-[facility]) [J43.9]   • Acute respiratory failure with hypoxia (CMS-[facility]) [J96.01]   • Chest pain [R07.9]       Past Surgical History:  No past surgical history on file.    Hospital Medications:    Current Facility-Administered Medications:   •  calcium carbonate (TUMS) chewable tab 500 mg, 500 mg, Oral, ACHS PRN **OR** calcium carbonate (TUMS) chewable tab 1,000 mg, 1,000 mg, Oral, ACHS PRN, Shanti Mendes, A.P.N., 1,000 mg at 09/13/17 0131  •  ciprofloxacin (CIPRO) tablet 500 mg, 500 mg, Oral, Q12HRS, Shabbir Saunders M.D., 500 mg at 09/13/17 1023  •  nicotine (NICODERM) 7 MG/24HR 7 mg, 7 mg, Transdermal, Daily-0600, Rosaline Lin A.P.R.N., 7 mg at 09/13/17 1023  •  aspirin (ASA) tablet 325 mg, 325 mg, Oral, DAILY, 325 mg at 09/13/17 1023 **OR** aspirin (ASA) chewable tab 324 mg, 324 mg, Oral, DAILY **OR** aspirin (ASA) suppository 300 mg, 300 mg, Rectal, DAILY, Shabbir Saunders M.D.  •  senna-docusate (PERICOLACE or SENOKOT S) 8.6-50 MG per tablet 2 Tab, 2 Tab, Oral, BID **AND** polyethylene glycol/lytes (MIRALAX) PACKET 1 Packet, 1 Packet, Oral,  QDAY PRN **AND** magnesium hydroxide (MILK OF MAGNESIA) suspension 30 mL, 30 mL, Oral, QDAY PRN **AND** bisacodyl (DULCOLAX) suppository 10 mg, 10 mg, Rectal, QDAY PRN, Shabbir Saunders M.D.  •  Respiratory Care per Protocol, , Nebulization, Continuous RT, Shabbir Saunders M.D.  •  acetaminophen (TYLENOL) tablet 650 mg, 650 mg, Oral, Q6HRS PRN, Shabbir Saunders M.D.  •  Notify provider if pain remains uncontrolled, , , CONTINUOUS **AND** Use the numeric rating scale (NRS-11) on regular floors and Critical-Care Pain Observation Tool (CPOT) on ICUs/Trauma to assess pain, , , CONTINUOUS **AND** Pulse Ox (Oximetry), , , CONTINUOUS **AND** [DISCONTINUED] Pharmacy Consult Request ...Pain Management Review, , Other, PRN **AND** If patient difficult to arouse and/or has respiratory depression, stop any opiates that are currently infusing and call a Rapid Response., , , CONTINUOUS **AND** oxycodone immediate-release (ROXICODONE) tablet 2.5 mg, 2.5 mg, Oral, Q3HRS PRN, Stopped at 09/13/17 0257 **AND** oxycodone immediate-release (ROXICODONE) tablet 5 mg, 5 mg, Oral, Q3HRS PRN, 5 mg at 09/13/17 0807 **AND** morphine (pf) 4 mg/ml injection 2 mg, 2 mg, Intravenous, Q3HRS PRN, Shabbir Saunders M.D.  •  Action is required: Protocol 1073 Hypoglycemia has been implemented, , , Once **AND** Protocol 1073 Inclusion Criteria, , , CONTINUOUS **AND** Protocol 1073 NOTIFY, , , Once **AND** Protocol 1073 Initiate protocol immediately if FSBG is less than or equal to 70 mg/dL, , , CONTINUOUS **AND** glucose 4 g chewable tablet 16 g, 16 g, Oral, Q15 MIN PRN **AND** dextrose 50% (D50W) injection 25 mL, 25 mL, Intravenous, Q15 MIN PRN, Shabbir Saunders M.D.  •  ondansetron (ZOFRAN) syringe/vial injection 4 mg, 4 mg, Intravenous, Q4HRS PRN, Shabbir Saunders M.D., 4 mg at 09/13/17 0807  •  ondansetron (ZOFRAN ODT) dispertab 4 mg, 4 mg, Oral, Q4HRS PRN, Shabbir Saunders M.D.  •  promethazine (PHENERGAN) tablet 12.5-25 mg, 12.5-25 mg, Oral, Q4HRS PRN, Shabbir Saunders M.D.  •   "promethazine (PHENERGAN) suppository 12.5-25 mg, 12.5-25 mg, Rectal, Q4HRS PRN, Shabbir Saunders M.D.  •  prochlorperazine (COMPAZINE) injection 5-10 mg, 5-10 mg, Intravenous, Q4HRS PRN, Shabbir Saunders M.D.  •  INITIATE NICOTINE REPLACEMENT PROTOCOL , , , Once **AND** [DISCONTINUED] nicotine (NICODERM) 21 MG/24HR 21 mg, 21 mg, Transdermal, Daily-0600 **AND** Protocol 205 PATIENT EDUCATION MATERIALS, , , Once **AND** Protocol 205 Rotate nicotine patch application sites daily , , , CONTINUOUS **AND** nicotine polacrilex (NICORETTE) 2 MG piece 2 mg, 2 mg, Oral, Q HOUR PRN, Shabbir Saunders M.D.  •  omeprazole (PRILOSEC) capsule 20 mg, 20 mg, Oral, DAILY, Shabbir Saunders M.D., 20 mg at 17 0636    Current Outpatient Medications:  No prescriptions prior to admission.       Medication Allergy:  Allergies   Allergen Reactions   • Prednisone    • Sulfa Drugs        Family History:  Father  MI at 50     Social History:  Social History     Social History   • Marital status: Single     Spouse name: N/A   • Number of children: N/A   • Years of education: N/A     Occupational History   • Not on file.     Social History Main Topics   • Smoking status: Current Every Day Smoker     Packs/day: 1.00     Types: Cigarettes     Start date: 1997   • Smokeless tobacco: Not on file   • Alcohol use Yes   • Drug use: No   • Sexual activity: Not on file     Other Topics Concern   • Not on file     Social History Narrative   • No narrative on file         Physical Exam:  Vitals  Weight/BMI: Body mass index is 22.76 kg/m².  Blood pressure 122/64, pulse (!) 54, temperature 36.6 °C (97.9 °F), resp. rate 18, height 1.753 m (5' 9\"), weight 69.9 kg (154 lb 1.6 oz), SpO2 97 %.  Vitals:    17 0106 17 0347 17 0752 17 1127   BP:  119/62 155/74 122/64   Pulse: (!) 53 (!) 48 (!) 53 (!) 54   Resp: 18 14 16 18   Temp:  36.4 °C (97.5 °F) 36.6 °C (97.8 °F) 36.6 °C (97.9 °F)   SpO2: 99% 99% 96% 97%   Weight:       Height:     "     Oxygen Therapy:  Pulse Oximetry: 97 %, O2 (LPM): 0, O2 Delivery: None (Room Air)  General Appearance:   Well developed, Well nourished, No acute distress, Non-toxic appearance.   HENT:  Normocephalic, Atraumatic, Oropharynx moist mucous membranes, Dentition: , Nose normal.    Eyes:  PERRLA, EOMI, Conjunctiva normal, No discharge.  Neck:  Normal range of motion, No cervical tenderness, Supple, No stridor, no JVD .  No thyromegaly.  No carotid bruit.  Cardiovascular:  Normal heart rate, Normal rhythm,  S1, S2, no S3,  S4; No gallops; 2-3/6 murmurs at MLSB and apex, No rubs, .   Extremitites with intact distal pulses, no cyanosis, clubbing or edema.  No heaves, thrills, HJR;  Peripheral pulses: carotid 2+, brachial 2+, radial 2+, ulnar 2+, femoral 2+, popliteal 2+, PT 2+, DP 2+;  Lungs:  Respiratory effort is normal. Normal breath sounds, breath sounds clear to auscultation bilaterally,  no rales, no rhonchi, no wheezing.   Abdomen: Bowel sounds normal, Soft, No tenderness, No guarding, No rebound, No masses, No hepatosplenomegaly.  Skin: Warm, Dry, No erythema, No rash, no induration or crepitus.  Neurologic: Alert & oriented x 3, Normal motor function, Normal sensory function, No focal deficits noted, cranial nerves II through XII are normal,  t.  Psychiatric: Affect normal, Judgment normal, Mood normal.      MDM (Data Review):     Records reviewed and summarized in current documentation    Lab Data Review:  Recent Results (from the past 24 hour(s))   EKG (ER)    Collection Time: 17  3:55 PM   Result Value Ref Range    Report       [facility] Emergency Dept.    Test Date:  2017  Pt Name:    YOUNG GAYLE                  Department: ER  MRN:        [id number]                      Room:  Gender:     M                            Technician: [id number]  :        1960                   Requested By:ER TRIAGE PROTOCOL  Order #:    [id number]                    Reading  MD:    Measurements  Intervals                                Axis  Rate:       91                           P:          71  VA:         148                          QRS:        6  QRSD:       98                           T:          47  QT:         364  QTc:        448    Interpretive Statements  SINUS TACHYCARDIA  MULTIPLE ATRIAL PREMATURE COMPLEXES  LEFT ATRIAL ABNORMALITY  LEFT VENTRICULAR HYPERTROPHY  ST DEPRESSION, CONSIDER ISCHEMIA, ANT-LAT LDS  No previous ECG available for comparison     EKG (ER)    Collection Time: 17  4:10 PM   Result Value Ref Range    Report       [facility] Emergency Dept.    Test Date:  2017  Pt Name:    YOUNG GAYLE                  Department: ER  MRN:        [id number]                      Room:       [facility]  Gender:     M                            Technician: [id number]  :        1960                   Requested By:CHERYL BLAKE  Order #:    [id number]                    Reading MD:    Measurements  Intervals                                Axis  Rate:       86                           P:          70  VA:         152                          QRS:        33  QRSD:       96                           T:          64  QT:         372  QTc:        445    Interpretive Statements  SINUS RHYTHM  MULTIPLE ATRIAL PREMATURE COMPLEXES  LEFT ATRIAL ABNORMALITY  RSR' IN V1 OR V2, PROBABLY NORMAL VARIANT  LEFT VENTRICULAR HYPERTROPHY  ST DEPRESSION, CONSIDER ISCHEMIA, LAT LEADS  Compared to ECG 2017 15:55:06  RSR' in V1 or V2 now present  Sinus tachycardia no longer present  ST (T wave) deviation still pr esent  Possible ischemia still present     CBC WITH DIFFERENTIAL    Collection Time: 17  4:27 PM   Result Value Ref Range    WBC 11.0 (H) 4.8 - 10.8 K/uL    RBC 5.30 4.70 - 6.10 M/uL    Hemoglobin 15.8 14.0 - 18.0 g/dL    Hematocrit 47.1 42.0 - 52.0 %    MCV 88.9 81.4 - 97.8 fL    MCH 29.8 27.0 - 33.0 pg    MCHC 33.5 (L) 33.7 - 35.3 g/dL    RDW 41.3  35.9 - 50.0 fL    Platelet Count 268 164 - 446 K/uL    MPV 10.8 9.0 - 12.9 fL    Neutrophils-Polys 74.90 (H) 44.00 - 72.00 %    Lymphocytes 17.60 (L) 22.00 - 41.00 %    Monocytes 6.80 0.00 - 13.40 %    Eosinophils 0.10 0.00 - 6.90 %    Basophils 0.40 0.00 - 1.80 %    Immature Granulocytes 0.20 0.00 - 0.90 %    Nucleated RBC 0.00 /100 WBC    Neutrophils (Absolute) 8.20 (H) 1.82 - 7.42 K/uL    Lymphs (Absolute) 1.93 1.00 - 4.80 K/uL    Monos (Absolute) 0.75 0.00 - 0.85 K/uL    Eos (Absolute) 0.01 0.00 - 0.51 K/uL    Baso (Absolute) 0.04 0.00 - 0.12 K/uL    Immature Granulocytes (abs) 0.02 0.00 - 0.11 K/uL    NRBC (Absolute) 0.00 K/uL   COMP METABOLIC PANEL    Collection Time: 09/12/17  4:27 PM   Result Value Ref Range    Sodium 138 135 - 145 mmol/L    Potassium 3.6 3.6 - 5.5 mmol/L    Chloride 106 96 - 112 mmol/L    Co2 24 20 - 33 mmol/L    Anion Gap 8.0 0.0 - 11.9    Glucose 101 (H) 65 - 99 mg/dL    Bun 13 8 - 22 mg/dL    Creatinine 0.74 0.50 - 1.40 mg/dL    Calcium 9.4 8.5 - 10.5 mg/dL    AST(SGOT) 20 12 - 45 U/L    ALT(SGPT) 15 2 - 50 U/L    Alkaline Phosphatase 52 30 - 99 U/L    Total Bilirubin 0.7 0.1 - 1.5 mg/dL    Albumin 4.0 3.2 - 4.9 g/dL    Total Protein 6.5 6.0 - 8.2 g/dL    Globulin 2.5 1.9 - 3.5 g/dL    A-G Ratio 1.6 g/dL   TROPONIN    Collection Time: 09/12/17  4:27 PM   Result Value Ref Range    Troponin I 0.01 0.00 - 0.04 ng/mL   LIPASE    Collection Time: 09/12/17  4:27 PM   Result Value Ref Range    Lipase 15 11 - 82 U/L   ESTIMATED GFR    Collection Time: 09/12/17  4:27 PM   Result Value Ref Range    GFR If African American >60 >60 mL/min/1.73 m 2    GFR If Non African American >60 >60 mL/min/1.73 m 2   URINALYSIS,CULTURE IF INDICATED    Collection Time: 09/12/17  5:02 PM   Result Value Ref Range    Color Yellow     Character Clear     Specific Gravity 1.018 <1.035    Ph 6.0 5.0 - 8.0    Glucose Negative Negative mg/dL    Ketones 40 (A) Negative mg/dL    Protein Negative Negative mg/dL    Bilirubin  Negative Negative    Urobilinogen, Urine 0.2 Negative    Nitrite Negative Negative    Leukocyte Esterase Negative Negative    Occult Blood Negative Negative    Micro Urine Req see below     Culture Indicated No UA Culture   TROPONIN    Collection Time: 17 10:00 PM   Result Value Ref Range    Troponin I 0.02 0.00 - 0.04 ng/mL   EKG (IP)    Collection Time: 17 10:01 PM   Result Value Ref Range    Report       [facility]    Test Date:  2017  Pt Name:    YOUNG GAYLE                  Department: CPU  MRN:        [id number]                      Room:       [facility]  Gender:     M                            Technician: [employer]  :        1960                   Requested By:SHAN LAYTON  Order #:    [id number]                    Reading MD:    Measurements  Intervals                                Axis  Rate:       51                           P:          71  PA:         152                          QRS:        47  QRSD:       90                           T:          3  QT:         456  QTc:        420    Interpretive Statements  SINUS BRADYCARDIA  PROBABLE LEFT ATRIAL ABNORMALITY  BORDERLINE REPOLARIZATION ABNORMALITY  Compared to ECG 2017 16:10:03  Sinus rhythm no longer present  Atrial premature complex(es) no longer present  Left ventricular hypertrophy no longer present  ST (T wave) deviation no longer present  Possible ischemia no longer present     EKG in four (4) hours    Collection Time: 17  1:35 AM   Result Value Ref Range    Report       [facility]    Test Date:  2017  Pt Name:    YOUNG GAYLE                  Department: CPU  MRN:        [id number]                      Room:       03  Gender:     M                            Technician: [facility]  :        1960                   Requested By:SHAN LAYTON  Order #:    [id number]                    Reading MD:    Measurements  Intervals                                Axis  Rate:       48                           P:           72  MN:         144                          QRS:        24  QRSD:       92                           T:          35  QT:         464  QTc:        415    Interpretive Statements  SINUS BRADYCARDIA  PROBABLE LEFT ATRIAL ABNORMALITY  BORDERLINE ST DEPRESSION, LATERAL LEADS  Compared to ECG 09/12/2017 22:01:37  ST (T wave) deviation now present     Troponin in four (4) hours    Collection Time: 09/13/17  2:02 AM   Result Value Ref Range    Troponin I 0.01 0.00 - 0.04 ng/mL   CBC with Differential    Collection Time: 09/13/17  2:02 AM   Result Value Ref Range    WBC 9.3 4.8 - 10.8 K/uL    RBC 4.79 4.70 - 6.10 M/uL    Hemoglobin 14.6 14.0 - 18.0 g/dL    Hematocrit 43.6 42.0 - 52.0 %    MCV 91.0 81.4 - 97.8 fL    MCH 30.5 27.0 - 33.0 pg    MCHC 33.5 (L) 33.7 - 35.3 g/dL    RDW 42.1 35.9 - 50.0 fL    Platelet Count 227 164 - 446 K/uL    MPV 10.7 9.0 - 12.9 fL    Neutrophils-Polys 70.20 44.00 - 72.00 %    Lymphocytes 21.40 (L) 22.00 - 41.00 %    Monocytes 7.30 0.00 - 13.40 %    Eosinophils 0.50 0.00 - 6.90 %    Basophils 0.30 0.00 - 1.80 %    Immature Granulocytes 0.30 0.00 - 0.90 %    Nucleated RBC 0.00 /100 WBC    Neutrophils (Absolute) 6.54 1.82 - 7.42 K/uL    Lymphs (Absolute) 1.99 1.00 - 4.80 K/uL    Monos (Absolute) 0.68 0.00 - 0.85 K/uL    Eos (Absolute) 0.05 0.00 - 0.51 K/uL    Baso (Absolute) 0.03 0.00 - 0.12 K/uL    Immature Granulocytes (abs) 0.03 0.00 - 0.11 K/uL    NRBC (Absolute) 0.00 K/uL   Basic Metabolic Panel (BMP)    Collection Time: 09/13/17  2:02 AM   Result Value Ref Range    Sodium 140 135 - 145 mmol/L    Potassium 3.8 3.6 - 5.5 mmol/L    Chloride 108 96 - 112 mmol/L    Co2 27 20 - 33 mmol/L    Glucose 104 (H) 65 - 99 mg/dL    Bun 14 8 - 22 mg/dL    Creatinine 0.89 0.50 - 1.40 mg/dL    Calcium 9.0 8.5 - 10.5 mg/dL    Anion Gap 5.0 0.0 - 11.9   ESTIMATED GFR    Collection Time: 09/13/17  2:02 AM   Result Value Ref Range    GFR If African American >60 >60 mL/min/1.73 m 2    GFR If Non African  American >60 >60 mL/min/1.73 m 2   ECHOCARDIOGRAM COMP W/O CONT    Collection Time: 09/13/17 10:30 AM   Result Value Ref Range    Eject.Frac. MOD BP 82.56     Eject.Frac. MOD 4C 81.27     Eject.Frac. MOD 2C 88.75     Left Ventrical Ejection Fraction 75        Imaging/Procedures Review:    Chest Xray:  Reviewed    EKG:   As in HPI. See above    MDM (Assessment and Plan):     Active Hospital Problems    Diagnosis   • History of prostatitis [Z87.438]   • Left lower quadrant pain [R10.32]   • Tobacco abuse [Z72.0]   • Emphysema of lung (CMS-HCC) [J43.9]   • Acute respiratory failure with hypoxia (CMS-HCC) [J96.01]   • Chest pain [R07.9]       Rec: MARCO; at least offer to him and long discussion about MV interventio options  Primary problem is abd  Advise quitting smoking  To follow closely with his cardiologist in [place] for further eval his MV issue  Case discussed with attending and RN  Will follow  Adonay

## 2017-09-13 NOTE — PROGRESS NOTES
Hospital Medicine Interval Note  Date of Service:  9/13/2017    Chief Complaint  56 y.o.-year-old male PMH emphysema, tobacco abuse, prostatitis admitted 9/12/2017 with chest pain and abdominal pain. Incomplete treatment for prostatitis, resumed on oral antibiotics.    Interval Problem Update  Today, the patients abdominal pain has improved. He denies fever/chills. Does endorse recent fatigue and SOB with MARY. Denies CP.     Consultants/Specialty  Dr Santana- cardiology    Disposition  Home once stable, pending MARCO     Review of Systems   Constitutional: Positive for malaise/fatigue. Negative for chills and fever.   Respiratory: Positive for shortness of breath (MARY worse over the past few months). Negative for cough and sputum production.    Cardiovascular: Negative for chest pain and palpitations.   Gastrointestinal: Negative for abdominal pain, nausea and vomiting.   Genitourinary: Negative for dysuria and urgency.   Musculoskeletal: Negative for back pain.   Neurological: Negative for dizziness and headaches.   All other systems reviewed and are negative.     Physical Exam Laboratory/Imaging   Vitals:    09/13/17 0106 09/13/17 0347 09/13/17 0752 09/13/17 1127   BP:  119/62 155/74 122/64   Pulse: (!) 53 (!) 48 (!) 53 (!) 54   Resp: 18 14 16 18   Temp:  36.4 °C (97.5 °F) 36.6 °C (97.8 °F) 36.6 °C (97.9 °F)   SpO2: 99% 99% 96% 97%   Weight:       Height:         Physical Exam   Constitutional: He is oriented to person, place, and time. He appears well-developed and well-nourished. No distress.   HENT:   Head: Normocephalic and atraumatic.   Eyes: Conjunctivae are normal. No scleral icterus.   Neck: Normal range of motion. Neck supple.   Cardiovascular: Normal rate and regular rhythm.    No murmur heard.  Pulmonary/Chest: Effort normal and breath sounds normal. No respiratory distress.   Abdominal: Soft. Bowel sounds are normal. He exhibits no distension. There is no tenderness.   Musculoskeletal: Normal range of  motion. He exhibits no edema.   Neurological: He is alert and oriented to person, place, and time.   Skin: Skin is warm and dry. No rash noted.   Vitals reviewed.   Lab Results   Component Value Date/Time    WBC 9.3 09/13/2017 02:02 AM    HEMOGLOBIN 14.6 09/13/2017 02:02 AM    HEMATOCRIT 43.6 09/13/2017 02:02 AM    PLATELETCT 227 09/13/2017 02:02 AM     Lab Results   Component Value Date/Time    SODIUM 140 09/13/2017 02:02 AM    POTASSIUM 3.8 09/13/2017 02:02 AM    CHLORIDE 108 09/13/2017 02:02 AM    CO2 27 09/13/2017 02:02 AM    GLUCOSE 104 (H) 09/13/2017 02:02 AM    BUN 14 09/13/2017 02:02 AM    CREATININE 0.89 09/13/2017 02:02 AM      Assessment/Plan    * Chest pain- (present on admission)   Assessment & Plan    NM stress test- neg  Trops- neg  EKG- normal  LVEF 65%, MV prolapse  Appreciate cards consult, Dr Santana  MARCO scheduled tomorrow  Cont ASA, statin        Mitral valve prolapse   Assessment & Plan    On ECHO, severe  Appreciate cards consult, MARCO pend  Will try to obtain old records        Sinus bradycardia   Assessment & Plan    Chronic per patient, asymptomatic, monitor on tele        Prostatitis   Assessment & Plan    Cont cipro x 21 days to complete 6 weeks treatment  UA neg, denies urinary sx        Acute respiratory failure with hypoxia (CMS-[facility])   Assessment & Plan    Resolved, monitor- now on RA        Emphysema of lung (CMS-[facility])   Assessment & Plan    RT protocol  OP pulmonology referral, needs PFTs          Tobacco abuse   Assessment & Plan    Cessation encouraged  Nicotine replacement               Reviewed items::  Labs reviewed and Medications reviewed  Stallworth catheter::  No Stallworth  DVT prophylaxis pharmacological::  Not indicated at this time, ambulatory  Ulcer Prophylaxis::  Not indicated

## 2017-09-13 NOTE — ED NOTES
Med techAnette notified of incomplete med rec for pt. Anette informed this RN she will go to T203 to complete pt's med rec.

## 2017-09-13 NOTE — H&P
Hospital Medicine History and Physical    Date of Service  9/13/2017    Chief Complaint  Chief Complaint   Patient presents with   • Chest Pain     Developed pain while in the lobby, no cardiac history, ekg done in triage, non radiating, epigastric   • Abdominal Pain     X3 days, hx prostatitis, diffuse in nature       History of Presenting Illness  56 y.o. male who presented 9/12/2017 with left lower abdominal pain for the past 3 days. Patient reports she was diagnosed with prostatitis and was started on Bactrim for a total 6 weeks, however he discontinued this after 3 weeks because of the unable to tolerate the medication. Patient denies any dysuria, urgency, urethral discharge, fever, diarrhea. Does report decreased appetite and nausea. Patient also reports chest pain for the past 1 week which is intermittent, substernal and pressure-like. The pain radiates to his left arm. He reports mild shortness of breath.      Primary Care Physician  No primary care provider on file.    Consultants  None    Code Status  Full Code    Review of Systems  Review of Systems   Constitutional: Negative for chills and fever.   Respiratory: Positive for shortness of breath. Negative for wheezing.    Cardiovascular: Positive for chest pain. Negative for palpitations and leg swelling.   Gastrointestinal: Positive for abdominal pain and nausea. Negative for diarrhea and vomiting.   Genitourinary: Negative for dysuria, hematuria and urgency.   All other systems reviewed and are negative.       Past Medical History  Past Medical History:   Diagnosis Date   • Emphysema lung (CMS-HCC)    • Inguinal hernia    • Mitral valve disorder    • Prostatitis        Surgical History  Left inguinal hernia repair    Medications  No current facility-administered medications on file prior to encounter.      No current outpatient prescriptions on file prior to encounter.       Family History  No family history on file.    Social History  Social History    Substance Use Topics   • Smoking status: Current Every Day Smoker     Packs/day: 1.00     Types: Cigarettes     Start date: 1997   • Smokeless tobacco: Not on file   • Alcohol use Yes       Allergies  Allergies   Allergen Reactions   • Prednisone    • Sulfa Drugs         Physical Exam  Laboratory   Hemodynamics  Temp (24hrs), Av.6 °C (97.8 °F), Min:36.1 °C (97 °F), Max:37.4 °C (99.3 °F)   Temperature: 36.1 °C (97 °F)  Pulse  Av.3  Min: 52  Max: 96 Heart Rate (Monitored): (!) 53  Blood Pressure: 101/55, NIBP: 116/65      Respiratory      Respiration: 17, Pulse Oximetry: 99 %             Physical Exam   Constitutional: He is oriented to person, place, and time. He appears well-developed and well-nourished. No distress.   HENT:   Head: Normocephalic and atraumatic.   Mouth/Throat: Oropharynx is clear and moist.   Eyes: Conjunctivae are normal. Pupils are equal, round, and reactive to light.   Neck: Neck supple.   Cardiovascular: Normal rate and regular rhythm.    Pulmonary/Chest: Effort normal and breath sounds normal. No respiratory distress. He has no wheezes.   Abdominal: Soft. Bowel sounds are normal. He exhibits no distension. There is tenderness (epigastric). There is no rebound.   Musculoskeletal: Normal range of motion. He exhibits no edema.   Neurological: He is alert and oriented to person, place, and time. No cranial nerve deficit. Coordination normal.   Skin: Skin is warm and dry.   Psychiatric: He has a normal mood and affect. His behavior is normal.   Nursing note and vitals reviewed.      Recent Labs      17   1627   WBC  11.0*   RBC  5.30   HEMOGLOBIN  15.8   HEMATOCRIT  47.1   MCV  88.9   MCH  29.8   MCHC  33.5*   RDW  41.3   PLATELETCT  268   MPV  10.8     Recent Labs      17   1627   SODIUM  138   POTASSIUM  3.6   CHLORIDE  106   CO2  24   GLUCOSE  101*   BUN  13   CREATININE  0.74   CALCIUM  9.4     Recent Labs      17   1627   ALTSGPT  15   ASTSGOT  20    ALKPHOSPHAT  52   TBILIRUBIN  0.7   LIPASE  15   GLUCOSE  101*                 Lab Results   Component Value Date    TROPONINI 0.02 09/12/2017     Urinalysis:    Lab Results  Component Value Date/Time   SPECGRAVITY 1.018 09/12/2017 1702   GLUCOSEUR Negative 09/12/2017 1702   KETONES 40 (A) 09/12/2017 1702   NITRITE Negative 09/12/2017 1702        Imaging  DX-CHEST-2 VIEWS   Final Result      1.  Hyperinflation suggesting COPD.   2.  No pneumonia or pneumothorax.      CT-ABDOMEN-PELVIS WITH   Final Result      Moderate colonic diverticulosis without evidence of diverticulitis      Cholecystectomy without biliary dilatation      Left renal and hepatic cysts      NM-CARDIAC STRESS TEST    (Results Pending)        Assessment/Plan     I anticipate this patient is appropriate for observation status at this time.    Emphysema of lung (CMS-HCC)   Assessment & Plan    Start the patient on oxygen and RT protocol with breathing treatments when necessary  We will have the patient follow up with pulmonology as an outpatient for PFT          Tobacco abuse   Assessment & Plan    Tobacco cessation education provided for more than 10 minutes, discussed options of nicotine patch, medical treatment with wellbutrin and chantix. Discussed the benefits of quitting smoking. Nicotine replacement protocol will be provided to the patient.          Left lower quadrant pain- (present on admission)   Assessment & Plan    CT abdomen and pelvis reveals moderate diverticulosis without evidence of diverticulitis, left renal and hepatic cysts  We will provide the patient with pain control          History of prostatitis- (present on admission)   Assessment & Plan    Patient does not complete 6 week course of Bactrim as prescribed due to being unable to tolerate it.  To ensure complete eradication I will start the patient on ciprofloxacin 500 mg twice a day for total of 3 weeks to complete a total course of 6 weeks of antibiotics  I will also send  for a repeat urine culture        Chest pain- (present on admission)   Assessment & Plan    Likely etiology is GERD versus gastritis, will rule out ACS  Patient had a cardiac cath in November 2015 which did not reveal any significant CAD, however did reveal mitral valve systolic anterior motion  Patient will be placed on cardiac monitoring and we will continue to monitor his troponins  I will order a 2-D echo cardiogram for further evaluation  If his troponins trend positive we will start the patient on IV heparin and consult cardiology  Patient has received a full dose of aspirin  I'll start the patient on omeprazole and GI cocktail            VTE prophylaxis: SCD.

## 2017-09-13 NOTE — PROGRESS NOTES
"Bedside report received 0700. POC discussed with pt; Pt c/o 7/10 pain in abdomen, Medicated per MAR; Pt tearful verbalizes frustration with the way he is feeling; Pt states,\" My tongue feels swollen, Swallow eval completed no deficits noted; No overnight events; all questions answered at this time.   "

## 2017-09-13 NOTE — ASSESSMENT & PLAN NOTE
NM stress test- neg  Trops- neg  EKG- normal  LVEF 65%, MV prolapse  Appreciate cards consult, Dr Max LARA scheduled tomorrow  Cont ASA, statin

## 2017-09-13 NOTE — DISCHARGE PLANNING
Care Transition Team Assessment    Information Source  Orientation : Oriented x 4  Information Given By: Patient  Who is responsible for making decisions for patient? : Patient         Elopement Risk  Legal Hold: No  Ambulatory or Self Mobile in Wheelchair: Yes  Disoriented: No  Psychiatric Symptoms: None  History of Wandering: No  Elopement this Admit: No  Vocalizing Wanting to Leave: No  Displays Behaviors, Body Language Wanting to Leave: No-Not at Risk for Elopement  Elopement Risk: Not at Risk for Elopement    Interdisciplinary Discharge Planning  Does Admitting Nurse Feel This Could be a Complex Discharge?: No  Lives with - Patient's Self Care Capacity: Alone and Able to Care For Self  Patient or legal guardian wants to designate a caregiver (see row info): No  Support Systems: Children  Housing / Facility: [address]  Do You Take your Prescribed Medications Regularly: Yes  Able to Return to Previous ADL's: Yes  Mobility Issues: No  Prior Services: None  Patient Expects to be Discharged to:: home  Assistance Needed: No  Durable Medical Equipment: Not Applicable    Discharge Preparedness  What is your plan after discharge?: Home with help  What are your discharge supports?: Child  Prior Functional Level: Ambulatory, Drives Self, Independent with Activities of Daily Living, Independent with Medication Management    Functional Assesment  Prior Functional Level: Ambulatory, Drives Self, Independent with Activities of Daily Living, Independent with Medication Management    Finances  Financial Barriers to Discharge: No    Vision / Hearing Impairment  Vision Impairment : Yes (wears glasses)  Right Eye Vision: Impaired, Wears Glasses  Left Eye Vision: Impaired, Wears Glasses  Hearing Impairment : No    Values / Beliefs / Concerns  Values / Beliefs Concerns : No         Domestic Abuse  Have you ever been the victim of abuse or violence?: No    Psychological Assessment  History of Substance Abuse: None    Discharge  Risks or Barriers  Discharge risks or barriers?: Other (comment) (pt is from out of town)    Anticipated Discharge Information  Anticipated discharge disposition: Home  Discharge Address: [place], [place]  Discharge Contact Phone Number: [phone]

## 2017-09-13 NOTE — DISCHARGE SUMMARY
Hospital Medicine Discharge Note     Patient ID:  Bernardo Pickering  [id number]  56 y.o.male  1960    Admit Date:  9/12/2017       Discharge Date:   9/14/2017    Primary Care Provider: established in [place]    Admitting Physician: Shabbir Saunders M.D.  Discharging Physician: Bruce Brenner MD    Chief Complaint: chest pain    Discharge Diagnoses:     Chest pain- resolved    Negative nuclear medicine stress test    Mitral vavlve prolapse    Severe mitral regurgitation    Prostatitis    Tobacco abuse    Sinus bradycardia    Acute respiratory failure with hypoxia (CMS-HCC)    Chronic Medical Problems:  Past Medical History:   Diagnosis Date   • Emphysema lung (CMS-HCC)    • Inguinal hernia    • Mitral valve disorder    • Prostatitis        Code Status: Full Code    Hospital Summary:       Please refer to H&P by Dr Saunders on 9/12/2017 for full details.      In brief, Bernardo Pickering is a 56 y.o. male who was admitted 9/12/2017 for Chest pain, described as intermittent midsternal chest pain for 1 week, radiating down his left arm. He reports shortness of breath and fatigue with exercise, that is worse over the past few months. He also complains of left lower abdominal pain for 3 days, after not completing a 6 week course of antibiotic for prostatitis. He denies any urinary symptoms, fever, chills, or nausea and vomiting. The patient was placed under observation status for a chest pain workup. He is visiting from [place] for the air [employer].    Patient was observed overnight on telemetry, without ectopy, though he did have bradycardia while sleeping that was asymptomatic. The patient underwent serial troponin monitoring remained negative. He is without respiratory symptoms, hypoxia, or tachycardia that would suggest pulmonary embolism as the etiology of his chest pain. He underwent NM stress test that was negative for cardiac ischemia. He underwent an echocardiogram, that does show mitral valve prolapse that is known to this patient.  However, this mitral valve prolapse is severe on echo. Cardiology was then called for consultation, and recommended that the patient had a MARCO and follow-up with his cardiologist in [place]. The patient underwent a MARCO, and has been cleared for discharge by cardiology. He is recommended to maintain adequate hydration, and encouraged to avoid dehydration. He is highly encouraged to quit smoking. He states he will work on quitting, and has been provided a prescription for nicotine patch.    Today, the patient remained chest pain-free. He does have some lower abdominal pain that is relieved by by mouth medications. Upon examination his abdomen is soft and nontender. He was resumed on antibiotic for his prostatitis, which was changed from Bactrim which he did not tolerate. He'll be continued on Cipro for 3 weeks to complete treatment of his prostatitis. His UA was negative, and he denies urinary symptoms. The patient will be discharged to follow-up with his PCP, and his cardiologist.    Therefore, he is discharged in good and stable condition with close outpatient follow-up.    Consultants:      Dr Santana- cardiology    Studies:    Imaging/ Testing:      ECHOCARDIOGRAM COMP W/O CONT   Final Result     CONCLUSIONS  No prior study is available for comparison.   Hyperdynamic left ventricular systolic function with dynamic LVOT   obstruction related to systolic anterior motion of the mitral leaflets.  Moderate concentric left ventricular hypertrophy.  Left ventricular ejection fraction is visually estimated to be greater   than 75%.  Significant bileaflet mitral valve prolapse with resultant systolic   anterior motion of the anterior mitral valve leaflet (RAMAKRISHNA).  Severe mitral regurgitation.  Estimated right ventricular systolic pressure is 45 mmHg.  Critical findings communicated to care team at time of reading.     NM-CARDIAC STRESS TEST   Final Result      NUCLEAR IMAGING INTERPRETATION   No evidence of significant  jeopardized viable myocardium or prior myocardial    infarction.   Normal left ventricular size, ejection fraction, and wall motion.     DX-CHEST-2 VIEWS   Final Result      1.  Hyperinflation suggesting COPD.   2.  No pneumonia or pneumothorax.      CT-ABDOMEN-PELVIS WITH   Final Result      Moderate colonic diverticulosis without evidence of diverticulitis      Cholecystectomy without biliary dilatation      Left renal and hepatic cysts      MARCO CONGENITAL W/O CONTRAST (Order not available for Rehab  Hospital)    (Results Pending)         Laboratory:   Recent Labs      09/12/17 1627 09/13/17   0202   WBC  11.0*  9.3   RBC  5.30  4.79   HEMOGLOBIN  15.8  14.6   HEMATOCRIT  47.1  43.6   MCV  88.9  91.0   MCH  29.8  30.5   MCHC  33.5*  33.5*   RDW  41.3  42.1   PLATELETCT  268  227   MPV  10.8  10.7       Recent Labs      09/12/17   1627 09/13/17   0202   SODIUM  138  140   POTASSIUM  3.6  3.8   CHLORIDE  106  108   CO2  24  27   GLUCOSE  101*  104*   BUN  13  14   CREATININE  0.74  0.89   CALCIUM  9.4  9.0       Recent Labs      09/12/17   1627 09/13/17   0202   ALTSGPT  15   --    ASTSGOT  20   --    ALKPHOSPHAT  52   --    TBILIRUBIN  0.7   --    LIPASE  15   --    GLUCOSE  101*  104*        Recent Labs      09/12/17   1627 09/12/17   2200  09/13/17   0202   TROPONINI  0.01  0.02  0.01     Results     Procedure Component Value Units Date/Time    URINE CULTURE(NEW) [[id number]] Collected:  09/13/17 0155    Order Status:  Completed Specimen:  Urine from Urine, Clean Catch Updated:  09/13/17 0204    Narrative:       To evaluate for bacterial prostatitis  Indication for culture:->Emergency Room Patient    URINE CULTURE(NEW) [[id number]]     Order Status:  Canceled Specimen:  Urine from Urine, Clean Catch     URINALYSIS,CULTURE IF INDICATED [[id number]]  (Abnormal) Collected:  09/12/17 1702    Order Status:  Completed Specimen:  Urine Updated:  09/12/17 1724     Color Yellow     Character Clear     Specific Gravity  1.018     Ph 6.0     Glucose Negative mg/dL      Ketones 40 (A) mg/dL      Protein Negative mg/dL      Bilirubin Negative     Urobilinogen, Urine 0.2     Nitrite Negative     Leukocyte Esterase Negative     Occult Blood Negative     Micro Urine Req see below     Comment: Microscopic examination not performed when specimen is clear  and chemically negative for protein, blood, leukocyte esterase  and nitrite.          Culture Indicated No UA Culture           Procedures/Surgeries:        9/14/17- MARCO- Dr Santana    Disposition:   Discharge home    Condition:  Stable    Instructions:   Activity: As tolerated.  Diet: cardiac  Followup:   -PCP 1 week  -Your cardiologist 1-2 weeks  Instructions:  -Quit smoking  -Given instructions to return to the ER if any new or worsening symptoms, worsening condition, or failure to improve  -Call PCP for followup  -No smoking, no alcohol, no caffeine  -Encourage risk factor reduction with tobacco and alcohol abstinence, diet changes, weight loss, and exercise.       Discharge Medications:           Medication List      START taking these medications      Instructions   acetaminophen 325 MG Tabs  Commonly known as:  TYLENOL   Take 2 Tabs by mouth every 6 hours as needed (Mild Pain; (Pain scale 1-3); Temp greater than 100.5 F).  Dose:  650 mg     ciprofloxacin 500 MG Tabs  Commonly known as:  CIPRO   Take 1 Tab by mouth every 12 hours for 21 days.  Dose:  500 mg     nicotine 7 MG/24HR Pt24  Commonly known as:  NICODERM   Apply 1 Patch to skin as directed every 24 hours.  Dose:  1 Patch     oxycodone immediate-release 5 MG Tabs  Commonly known as:  ROXICODONE   Take 1 Tab by mouth every 6 hours as needed for Severe Pain.  Dose:  5 mg              Please CC the above physicians    TIERRA Garcia  9/13/2017  2:42 PM

## 2017-09-14 VITALS
SYSTOLIC BLOOD PRESSURE: 138 MMHG | WEIGHT: 154.1 LBS | RESPIRATION RATE: 16 BRPM | DIASTOLIC BLOOD PRESSURE: 97 MMHG | TEMPERATURE: 98 F | HEIGHT: 69 IN | OXYGEN SATURATION: 95 % | BODY MASS INDEX: 22.82 KG/M2 | HEART RATE: 54 BPM

## 2017-09-14 PROBLEM — R00.1 SINUS BRADYCARDIA: Status: RESOLVED | Noted: 2017-09-13 | Resolved: 2017-09-14

## 2017-09-14 PROBLEM — N41.9 PROSTATITIS: Status: RESOLVED | Noted: 2017-09-13 | Resolved: 2017-09-14

## 2017-09-14 PROCEDURE — A9270 NON-COVERED ITEM OR SERVICE: HCPCS | Performed by: NURSE PRACTITIONER

## 2017-09-14 PROCEDURE — 700101 HCHG RX REV CODE 250

## 2017-09-14 PROCEDURE — 93325 DOPPLER ECHO COLOR FLOW MAPG: CPT

## 2017-09-14 PROCEDURE — 700102 HCHG RX REV CODE 250 W/ 637 OVERRIDE(OP): Performed by: NURSE PRACTITIONER

## 2017-09-14 PROCEDURE — 36415 COLL VENOUS BLD VENIPUNCTURE: CPT

## 2017-09-14 PROCEDURE — 700102 HCHG RX REV CODE 250 W/ 637 OVERRIDE(OP): Performed by: INTERNAL MEDICINE

## 2017-09-14 PROCEDURE — G0378 HOSPITAL OBSERVATION PER HR: HCPCS

## 2017-09-14 PROCEDURE — 99217 PR OBSERVATION CARE DISCHARGE: CPT | Performed by: HOSPITALIST

## 2017-09-14 PROCEDURE — 93320 DOPPLER ECHO COMPLETE: CPT

## 2017-09-14 PROCEDURE — A9270 NON-COVERED ITEM OR SERVICE: HCPCS | Performed by: INTERNAL MEDICINE

## 2017-09-14 PROCEDURE — 93312 ECHO TRANSESOPHAGEAL: CPT

## 2017-09-14 PROCEDURE — 700111 HCHG RX REV CODE 636 W/ 250 OVERRIDE (IP)

## 2017-09-14 RX ORDER — NICOTINE 21 MG/24HR
1 PATCH, TRANSDERMAL 24 HOURS TRANSDERMAL EVERY 24 HOURS
Qty: 30 PATCH | Refills: 1 | Status: SHIPPED | OUTPATIENT
Start: 2017-09-14 | End: 2017-09-14

## 2017-09-14 RX ADMIN — CIPROFLOXACIN HYDROCHLORIDE 500 MG: 500 TABLET, FILM COATED ORAL at 14:24

## 2017-09-14 RX ADMIN — OMEPRAZOLE 20 MG: 20 CAPSULE, DELAYED RELEASE ORAL at 14:21

## 2017-09-14 RX ADMIN — MAGNESIUM HYDROXIDE 30 ML: 400 SUSPENSION ORAL at 14:22

## 2017-09-14 RX ADMIN — NICOTINE 7 MG: 7 PATCH TRANSDERMAL at 08:29

## 2017-09-14 RX ADMIN — STANDARDIZED SENNA CONCENTRATE AND DOCUSATE SODIUM 2 TABLET: 8.6; 5 TABLET, FILM COATED ORAL at 14:21

## 2017-09-14 ASSESSMENT — PAIN SCALES - WONG BAKER
WONGBAKER_NUMERICALRESPONSE: DOESN'T HURT AT ALL

## 2017-09-14 ASSESSMENT — PAIN SCALES - GENERAL
PAINLEVEL_OUTOF10: 0

## 2017-09-14 NOTE — PROGRESS NOTES
"Date of Service: 9/14/2017  Chief Complaint:  Chief Complaint   Patient presents with   • Chest Pain     Developed pain while in the lobby, no cardiac history, ekg done in triage, non radiating, epigastric   • Abdominal Pain     X3 days, hx prostatitis, diffuse in nature     Interval History:  Feels fbetter w/o cardiac sx's but anxious about MARCO today; Question about sedation and recovery  All recent medication, labs, imaging studies and procedures reviewed    Physical Exam   Blood pressure 138/97, pulse (!) 54, temperature 36.7 °C (98 °F), resp. rate 16, height 1.753 m (5' 9\"), weight 69.9 kg (154 lb 1.6 oz), SpO2 95 %.    General Appearance:   Well developed, Well nourished, No acute distress, Non-toxic appearance.   HENT:  Normocephalic, Atraumatic, Oropharynx moist mucous membranes, Dentition: , Nose normal.    Eyes:  PERRLA, EOMI, Conjunctiva normal, No discharge.  Neck:  Normal range of motion, No cervical tenderness, Supple, No stridor, no JVD .  No thyromegaly.  No carotid bruit.  Cardiovascular:  Normal heart rate, Normal rhythm,  S1, S2, no S3,  S4; No gallops; 2-3/6 murmurs at MLSB and apex, No rubs, .   Extremitites with intact distal pulses, no cyanosis, clubbing or edema.  No heaves, thrills, HJR;  Peripheral pulses: carotid 2+, brachial 2+, radial 2+, ulnar 2+, femoral 2+, popliteal 2+, PT 2+, DP 2+;  Lungs:  Respiratory effort is normal. Normal breath sounds, breath sounds clear to auscultation bilaterally,  no rales, no rhonchi, no wheezing.   Abdomen: Bowel sounds normal, Soft, No tenderness, No guarding, No rebound, No masses, No hepatosplenomegaly.  Skin: Warm, Dry, No erythema, No rash, no induration or crepitus.  Neurologic: Alert & oriented x 3, Normal motor function, Normal sensory function, No focal deficits noted, cranial nerves II through XII are normal,  t.  Psychiatric: Affect normal, Judgment normal, Mood normal.     No intake or output data in the 24 hours ending 09/14/17 " 0801    LABS:  No results found for: CHOLSTRLTOT, LDL, HDL, TRIGLYCERIDE    Lab Results   Component Value Date/Time    WBC 9.3 09/13/2017 02:02 AM    RBC 4.79 09/13/2017 02:02 AM    HEMOGLOBIN 14.6 09/13/2017 02:02 AM    HEMATOCRIT 43.6 09/13/2017 02:02 AM    MCV 91.0 09/13/2017 02:02 AM    NEUTSPOLYS 70.20 09/13/2017 02:02 AM    LYMPHOCYTES 21.40 (L) 09/13/2017 02:02 AM    MONOCYTES 7.30 09/13/2017 02:02 AM    EOSINOPHILS 0.50 09/13/2017 02:02 AM    BASOPHILS 0.30 09/13/2017 02:02 AM     Lab Results   Component Value Date/Time    SODIUM 140 09/13/2017 02:02 AM    POTASSIUM 3.8 09/13/2017 02:02 AM    CHLORIDE 108 09/13/2017 02:02 AM    CO2 27 09/13/2017 02:02 AM    GLUCOSE 104 (H) 09/13/2017 02:02 AM    BUN 14 09/13/2017 02:02 AM    CREATININE 0.89 09/13/2017 02:02 AM         Lab Results   Component Value Date/Time    ALKPHOSPHAT 52 09/12/2017 04:27 PM    ASTSGOT 20 09/12/2017 04:27 PM    ALTSGPT 15 09/12/2017 04:27 PM    TBILIRUBIN 0.7 09/12/2017 04:27 PM      No results found for: BNPBTYPENAT   No results found for: TSH  No results found for: PROTHROMBTM, INR     Medications reviewed    Imaging reviewed    ECHO(9/13/2017):No prior study is available for comparison.   Hyperdynamic left ventricular systolic function with dynamic LVOT   obstruction related to systolic anterior motion of the mitral leaflets.  Moderate concentric left ventricular hypertrophy.  Left ventricular ejection fraction is visually estimated to be greater   than 75%.  Significant bileaflet mitral valve prolapse with resultant systolic   anterior motion of the anterior mitral valve leaflet (RAMAKRISHNA).  Severe mitral regurgitation.  Estimated right ventricular systolic pressure is 45 mmHg.  Critical findings communicated to care team at time of reading.    Stress roberto carlos:During the infusion, there were electrocardiographic changes diagnostic of ischemia with 2 mm down-sloaping ST depression inferolaterally.NUCLEAR IMAGING INTERPRETATION   No evidence of  significant jeopardized viable myocardium or prior myocardial    infarction.   Normal left ventricular size, ejection fraction, and wall motion.     Impressions:  Recommendations:    • History of prostatitis [Z87.438]   • Left lower quadrant pain [R10.32]   • Tobacco abuse [Z72.0]   • Emphysema of lung (CMS-[facility]) [J43.9]   • Acute respiratory failure with hypoxia (CMS-[facility]) [J96.01]   • Chest pain [R07.9]      Neg ischemia; LVH with st abnormality on stress test noted   Rec: MARCO; at least offer to him and long discussion about MV interventio options    Advise quitting smoking  To follow closely with his cardiologist in [place] for further eval his MV issue  Case discussed with attending and RN  Will follow  Thx

## 2017-09-14 NOTE — RESPIRATORY CARE
COPD EDUCATION by COPD CLINICAL EDUCATOR  9/14/2017 at 6:47 AM by Irena Brown     Patient reviewed by COPD education team. Patient does not qualify for COPD program.

## 2017-09-14 NOTE — PROGRESS NOTES
Pt back from MARCO,awake,a&ox4,gag reflex checked,no aspiration tendency,pt served with food,poc explained,v/s wnl,no resp distress.

## 2017-09-14 NOTE — DISCHARGE INSTRUCTIONS
Discharge Instructions    Discharged to home by car with relative. Discharged via walking, hospital escort: Yes.  Special equipment needed: Not Applicable    Be sure to schedule a follow-up appointment with your primary care doctor or any specialists as instructed.     Discharge Plan:   Diet Plan: Discussed  Activity Level: Discussed  Confirmed Follow up Appointment: Patient to Call and Schedule Appointment  Confirmed Symptoms Management: Discussed  Medication Reconciliation Updated: Yes  Influenza Vaccine Indication: Patient Refuses    I understand that a diet low in cholesterol, fat, and sodium is recommended for good health. Unless I have been given specific instructions below for another diet, I accept this instruction as my diet prescription.   Other diet:     Special Instructions: None    · Is patient discharged on Warfarin / Coumadin?   No     · Is patient Post Blood Transfusion?  NoCiprofloxacin tablets  What is this medicine?  CIPROFLOXACIN (sip jennifer FLOX a sin) is a quinolone antibiotic. It is used to treat certain kinds of bacterial infections. It will not work for colds, flu, or other viral infections.  This medicine may be used for other purposes; ask your health care provider or pharmacist if you have questions.  COMMON BRAND NAME(S): Cipro  What should I tell my health care provider before I take this medicine?  They need to know if you have any of these conditions:  -bone problems  -cerebral disease  -joint problems  -irregular heartbeat  -kidney disease  -liver disease  -myasthenia gravis  -seizure disorder  -tendon problems  -an unusual or allergic reaction to ciprofloxacin, other antibiotics or medicines, foods, dyes, or preservatives  -pregnant or trying to get pregnant  -breast-feeding  How should I use this medicine?  Take this medicine by mouth with a glass of water. Follow the directions on the prescription label. Take your medicine at regular intervals. Do not take your medicine more often  than directed. Take all of your medicine as directed even if you think your are better. Do not skip doses or stop your medicine early.  You can take this medicine with food or on an empty stomach. It can be taken with a meal that contains dairy or calcium, but do not take it alone with a dairy product, like milk or yogurt or calcium-fortified juice.  A special MedGuide will be given to you by the pharmacist with each prescription and refill. Be sure to read this information carefully each time.  Talk to your pediatrician regarding the use of this medicine in children. Special care may be needed.  Overdosage: If you think you have taken too much of this medicine contact a poison control center or emergency room at once.  NOTE: This medicine is only for you. Do not share this medicine with others.  What if I miss a dose?  If you miss a dose, take it as soon as you can. If it is almost time for your next dose, take only that dose. Do not take double or extra doses.  What may interact with this medicine?  Do not take this medicine with any of the following medications:  -cisapride  -droperidol  -terfenadine  -tizanidine  This medicine may also interact with the following medications:  -antacids  -caffeine  -cyclosporin  -didanosine (ddI) buffered tablets or powder  -medicines for diabetes  -medicines for inflammation like ibuprofen, naproxen  -methotrexate  -multivitamins  -omeprazole  -phenytoin  -probenecid  -sucralfate  -theophylline  -warfarin  This list may not describe all possible interactions. Give your health care provider a list of all the medicines, herbs, non-prescription drugs, or dietary supplements you use. Also tell them if you smoke, drink alcohol, or use illegal drugs. Some items may interact with your medicine.  What should I watch for while using this medicine?  Tell your doctor or health care professional if your symptoms do not improve.  Do not treat diarrhea with over the counter products. Contact  your doctor if you have diarrhea that lasts more than 2 days or if it is severe and watery.  You may get drowsy or dizzy. Do not drive, use machinery, or do anything that needs mental alertness until you know how this medicine affects you. Do not stand or sit up quickly, especially if you are an older patient. This reduces the risk of dizzy or fainting spells.  This medicine can make you more sensitive to the sun. Keep out of the sun. If you cannot avoid being in the sun, wear protective clothing and use sunscreen. Do not use sun lamps or tanning beds/booths.  Avoid antacids, aluminum, calcium, iron, magnesium, and zinc products for 6 hours before and 2 hours after taking a dose of this medicine.  What side effects may I notice from receiving this medicine?  Side effects that you should report to your doctor or health care professional as soon as possible:  -  allergic reactions like skin rash, itching or hives, swelling of the face, lips, or tongue  -  breathing problems  -  confusion, nightmares or hallucinations  -  feeling faint or lightheaded, falls  -  irregular heartbeat  -  joint, muscle or tendon pain or swelling  -  pain or trouble passing urine  -persistent headache with or without blurred vision  -  redness, blistering, peeling or loosening of the skin, including inside the mouth  -  seizure  -  unusual pain, numbness, tingling, or weakness  Side effects that usually do not require medical attention (report to your doctor or health care professional if they continue or are bothersome):  -  diarrhea  -  nausea or stomach upset  -  white patches or sores in the mouth  This list may not describe all possible side effects. Call your doctor for medical advice about side effects. You may report side effects to FDA at 6-435-FDA-8685.  Where should I keep my medicine?  Keep out of the reach of children.  Store at room temperature below 30 degrees C (86 degrees F). Keep container tightly closed. Throw away any  unused medicine after the expiration date.  NOTE: This sheet is a summary. It may not cover all possible information. If you have questions about this medicine, talk to your doctor, pharmacist, or health care provider.  © 2014, Elsevier/Gold Standard. (1/4/2013 12:53:06 PM)  Oxycodone tablets or capsules  What is this medicine?  OXYCODONE (ox i KOE done) is a pain reliever. It is used to treat moderate to severe pain.  This medicine may be used for other purposes; ask your health care provider or pharmacist if you have questions.  COMMON BRAND NAME(S): Dazidox , Endocodone , OXECTA, OxyIR, Percolone, Roxicodone  What should I tell my health care provider before I take this medicine?  They need to know if you have any of these conditions:  -[place]'s disease  -brain tumor  -drug abuse or addiction  -head injury  -heart disease  -if you frequently drink alcohol containing drinks  -kidney disease or problems going to the bathroom  -liver disease  -lung disease, asthma, or breathing problems  -mental problems  -an unusual or allergic reaction to oxycodone, codeine, hydrocodone, morphine, other medicines, foods, dyes, or preservatives  -pregnant or trying to get pregnant  -breast-feeding  How should I use this medicine?  Take this medicine by mouth with a glass of water. Follow the directions on the prescription label. You can take it with or without food. If it upsets your stomach, take it with food. Take your medicine at regular intervals. Do not take it more often than directed. Do not stop taking except on your doctor's advice.  Some brands of this medicine, like Oxecta, have special instructions. Ask your doctor or pharmacist if these directions are for you: Do not cut, crush or chew this medicine. Swallow only one tablet at a time. Do not wet, soak, or lick the tablet before you take it.  Talk to your pediatrician regarding the use of this medicine in children. Special care may be needed.  Overdosage: If you think  you have taken too much of this medicine contact a poison control center or emergency room at once.  NOTE: This medicine is only for you. Do not share this medicine with others.  What if I miss a dose?  If you miss a dose, take it as soon as you can. If it is almost time for your next dose, take only that dose. Do not take double or extra doses.  What may interact with this medicine?  -alcohol  -antihistamines  -certain medicines used for nausea like chlorpromazine, droperidol  -erythromycin  -ketoconazole  -medicines for depression, anxiety, or psychotic disturbances  -medicines for sleep  -muscle relaxants  -naloxone  -naltrexone  -narcotic medicines (opiates) for pain  -nilotinib  -phenobarbital  -phenytoin  -rifampin  -ritonavir  -voriconazole  This list may not describe all possible interactions. Give your health care provider a list of all the medicines, herbs, non-prescription drugs, or dietary supplements you use. Also tell them if you smoke, drink alcohol, or use illegal drugs. Some items may interact with your medicine.  What should I watch for while using this medicine?  Tell your doctor or health care professional if your pain does not go away, if it gets worse, or if you have new or a different type of pain. You may develop tolerance to the medicine. Tolerance means that you will need a higher dose of the medicine for pain relief. Tolerance is normal and is expected if you take this medicine for a long time.  Do not suddenly stop taking your medicine because you may develop a severe reaction. Your body becomes used to the medicine. This does NOT mean you are addicted. Addiction is a behavior related to getting and using a drug for a non-medical reason. If you have pain, you have a medical reason to take pain medicine. Your doctor will tell you how much medicine to take. If your doctor wants you to stop the medicine, the dose will be slowly lowered over time to avoid any side effects.  You may get drowsy  or dizzy when you first start taking this medicine or change doses. Do not drive, use machinery, or do anything that may be dangerous until you know how the medicine affects you. Stand or sit up slowly.  There are different types of narcotic medicines (opiates) for pain. If you take more than one type at the same time, you may have more side effects. Give your health care provider a list of all medicines you use. Your doctor will tell you how much medicine to take. Do not take more medicine than directed. Call emergency for help if you have problems breathing.  This medicine will cause constipation. Try to have a bowel movement at least every 2 to 3 days. If you do not have a bowel movement for 3 days, call your doctor or health care professional.  Your mouth may get dry. Drinking water, chewing sugarless gum, or sucking on hard candy may help. See your dentist every 6 months.  What side effects may I notice from receiving this medicine?  Side effects that you should report to your doctor or health care professional as soon as possible:  -allergic reactions like skin rash, itching or hives, swelling of the face, lips, or tongue  -breathing problems  -confusion  -feeling faint or lightheaded, falls  -trouble passing urine or change in the amount of urine  -unusually weak or tired  Side effects that usually do not require medical attention (report to your doctor or health care professional if they continue or are bothersome):  -constipation  -dry mouth  -itching  -nausea, vomiting  -upset stomach  This list may not describe all possible side effects. Call your doctor for medical advice about side effects. You may report side effects to FDA at 9-578-FDA-7569.  Where should I keep my medicine?  Keep out of the reach of children. This medicine can be abused. Keep your medicine in a safe place to protect it from theft. Do not share this medicine with anyone. Selling or giving away this medicine is dangerous and against  the law.  Store at room temperature between 15 and 30 degrees C (59 and 86 degrees F). Protect from light. Keep container tightly closed.   Discard unused medicine and used packaging carefully. Pets and children can be harmed if they find used or lost packages. Flush any unused medicines down the toilet. Do not use the medicine after the expiration date.  NOTE: This sheet is a summary. It may not cover all possible information. If you have questions about this medicine, talk to your doctor, pharmacist, or health care provider.  © 2014, Elsevier/Gold Standard. (11/15/2012 8:33:37 AM)      Depression / Suicide Risk    As you are discharged from this [facility] facility, it is important to learn how to keep safe from harming yourself.    Recognize the warning signs:  · Abrupt changes in personality, positive or negative- including increase in energy   · Giving away possessions  · Change in eating patterns- significant weight changes-  positive or negative  · Change in sleeping patterns- unable to sleep or sleeping all the time   · Unwillingness or inability to communicate  · Depression  · Unusual sadness, discouragement and loneliness  · Talk of wanting to die  · Neglect of personal appearance   · Rebelliousness- reckless behavior  · Withdrawal from people/activities they love  · Confusion- inability to concentrate     If you or a loved one observes any of these behaviors or has concerns about self-harm, here's what you can do:  · Talk about it- your feelings and reasons for harming yourself  · Remove any means that you might use to hurt yourself (examples: pills, rope, extension cords, firearm)  · Get professional help from the community (Mental Health, Substance Abuse, psychological counseling)  · Do not be alone:Call your Safe Contact- someone whom you trust who will be there for you.  · Call your local CRISIS HOTLINE 000-1407 or 208-816-6528  · Call your local Children's Mobile Crisis Response Team [place] (018) 102-0080 or www.[facility].Apiary  · Call the toll free National Suicide Prevention Hotlines   · National Suicide Prevention Lifeline 560-574-YSCJ (1547)  · National Go [place]verse[place] Line Network 800-SUICIDE (187-7926)

## 2017-09-14 NOTE — NON-PROVIDER
CHIEF COMPLAINT:   Very tired, rundown, throbbing head and chest    HISTORY OF PRESENTING ILLNESS:   56 year old male from [place] with past medical history of prostatitis and 42 pack year smoking history presenting with progressive fatigue and intermittent chest pain. On Tuesday (9/12) he was feeling very dizzy and achy shortly after arriving in [place] so he came to the ER. There is no radiation of the pain and it is more of his epigastric region, it is of a burning and achy nature. Sometimes it becomes a sharp/stabbing sensation on the left side but that is not associated with exertion. It just randomly happens at rest or with activity. He avoids spicy and greasy foods because they exacerbate the pain. Bending over also exacerbates the pain. There are no specific alleviating factors. He gets an acidic taste in his mouth and believes it is heartburn. He has not taken medications for this. He drinks alcohol rarely (12 beers a year). He was diagnosed with prostatitis in mid June for which he was given a 3 week course of TMP-SMX which he did not complete because it made him feel very sick. He still has associated groin pain on the left side. He is has had a left inguinal hernia repair.  He is positive for dysuria but negative for hematuria, pain while urinating or difficulty starting/stopping urination. His dysuria and pain have improved since mid June but they have not gone away.     CURRENT OUTPATIENT MEDICATIONS:   No current facility-administered medications on file prior to encounter.      No current outpatient prescriptions on file prior to encounter.       HOSPITAL MEDICATIONS:    Current Facility-Administered Medications:   •  calcium carbonate **OR** calcium carbonate  •  ciprofloxacin  •  nicotine  •  aspirin **OR** aspirin **OR** aspirin  •  senna-docusate **AND** polyethylene glycol/lytes **AND** magnesium hydroxide **AND** bisacodyl  •  Respiratory Care per Protocol  •  acetaminophen  •  Notify provider if  pain remains uncontrolled **AND** Use the numeric rating scale (NRS-11) on regular floors and Critical-Care Pain Observation Tool (CPOT) on ICUs/Trauma to assess pain **AND** Pulse Ox (Oximetry) **AND** [DISCONTINUED] Pharmacy Consult Request **AND** If patient difficult to arouse and/or has respiratory depression, stop any opiates that are currently infusing and call a Rapid Response. **AND** oxycodone immediate-release **AND** oxycodone immediate-release **AND** morphine injection  •  Action is required: Protocol 1073 Hypoglycemia has been implemented **AND** Protocol 1073 Inclusion Criteria **AND** Protocol 1073 NOTIFY **AND** Protocol 1073 Initiate protocol immediately if FSBG is less than or equal to 70 mg/dL **AND** glucose 4 g **AND** dextrose 50%  •  ondansetron  •  ondansetron  •  promethazine  •  promethazine  •  prochlorperazine  •  INITIATE NICOTINE REPLACEMENT PROTOCOL  **AND** [DISCONTINUED] nicotine **AND** Protocol 205 PATIENT EDUCATION MATERIALS **AND** Protocol 205 Rotate nicotine patch application sites daily  **AND** nicotine polacrilex  •  omeprazole      ALLERGIES:   Allergies   Allergen Reactions   • Prednisone    • Sulfa Drugs    Penicillin upsets his whole body    PAST MEDICAL HISTORY:  Past Medical History:   Diagnosis Date   • Emphysema lung (CMS-HCC)    • Inguinal hernia    • Mitral valve disorder    • Prostatitis    left shoulder bursitis    PAST SURGICAL HISTORY:  Removal of left branchial cleft cyst.  Left inguinal hernia and umbilical hernia repair.     SOCIAL HISTORY:  Social History     Social History   • Marital status: Single     Spouse name: N/A   • Number of children: N/A   • Years of education: N/A     Occupational History   • Not on file.     Social History Main Topics   • Smoking status: Current Every Day Smoker     Packs/day: 1.00     Types: Cigarettes     Start date: 9/12/1997   • Smokeless tobacco: Not on file   • Alcohol use Yes   • Drug use: No   • Sexual activity: Not  "on file     Other Topics Concern   • Not on file     Social History Narrative   • No narrative on file       FAMILY HISTORY:  Father: htn, lung cancer  Mother: htn    REVIEW OF SYSTEMS:  General - positive for bilateral outer arm pain and medial left ankle pain. negative for fever and chills, and weight change.  Skin - negative for rash and pruritis   Head/neck - positive for tongue swelling. negative for headache, visual changes, or congestion   Cardiovascular - positive for chest pain & palpitations   Respiratory - positive for SOB. negative for cough and sputum production   Gastrointestinal - positive for constipation. negative for nausea, vomiting, diarrhea, and abdominal pain   Genitourinary - positive for dysuria. negative hematuria, and flank pain  Neurological - positive for dizziness, blurry vision, tinnitus and weakness. 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  Tinnitus and blurry vision are transient. Negative for confusion.     VITALS:  Weight/BMI: Body mass index is 22.76 kg/m².  Blood pressure 138/97, pulse (!) 54, temperature 36.7 °C (98 °F), resp. rate 16, height 1.753 m (5' 9\"), weight 69.9 kg (154 lb 1.6 oz), SpO2 95 %.  Vitals:    09/13/17 1951 09/14/17 0016 09/14/17 0352 09/14/17 0750   BP: 139/71 144/71 125/71 138/97   Pulse: 62 79 (!) 55 (!) 54   Resp: 17 15 14 16   Temp: 36.8 °C (98.2 °F) 36.8 °C (98.2 °F) 36.5 °C (97.7 °F) 36.7 °C (98 °F)   SpO2: 94% 94% 96% 95%   Weight:       Height:         Oxygen Therapy:  Pulse Oximetry: 95 %, O2 (LPM): 0, O2 Delivery: None (Room Air)    PHYSICAL EXAM:  General - well appearing middle aged male in no acute cardiorespiratory distress, AAOx4, anicteric, acyanotic  HEENT: no conjunctival pallor, mucous membranes pink and moist, no lymphadenopathy, no thyromegaly or tenderness on palpation, no jugular venous distention  Skin - warm, dry, no erythema. There is a 1 cm round, hairless dark " brown macule over his right scapula. There is a 1 cm round, dark brown hairy macula on his left lower back.  Cardiovascular - normal rate, normal rhythm, murmur auscultated, extremities with intact distal pulses, no edema  Respiratory - normal breath sounds, no shortness of breath, no wheezing, no rales, bilateral chest rise and fall, mild tenderness over his sternum   Gastrointestinal - soft, non-tender, non-distended, normal bowel sounds, no abdominal pain, no palpable hepatosplenomegaly, no masses   Musculoskeletal - normal muscle tone, no obvious deformities   Neurological - no tremor, normal judgement, affect, and mood     LABS:  WBC: 9.3   Glucose: 104 H  Trop: 0.01 N    ASSESSMENT and PLAN:    1. Chest pain: he has had intermittent chest pain that can occur at rest or with exertion. Pain is right above sternum and more in the epigastric region with no radiation of the pain.  Pain is exacerbated by spicy or greasy foods. Cardiac stress test showed no evidence of myocardial infarction. There is normal left ventricular size and LVEF of 80%. MARCO is pending.   2. Emphysema of lung: Patient has a 42 pack year cigarette smoking history. He has intermittent SOB. Hyperinflation of lungs was seen on chest Xray. He has tried to quit previously with Chantix but it did not work. He is aware of his need to quit and agrees to work with his primary care in [place] to figure out a plan for him. He does like the nicotine patch that he was put on in the hospital.   3. Tobacco abuse: he has a family history of lung cancer in his father and is aware that smoking can cause it. Patient was educated on the dangers of tobacco abuse and he is aware that he needs to quit to stop the progression of lung damage. He has agreed to work with his primary care to make a plan for him to quit.  4. Acute respiratory failure with hypoxia: Patient has a 42 pack year smoking history.   5. Prostatitis: he has a history of prostatitis for which he did  not complete his antibiotic course.  He is positive for dysuria and left groin pain but is negative for pain while urinating and hematuria. He has been put on ciprofloxacin for 21 days.  6. Sinus bradycardia: asymptomatic. Being monitored on telemetry.   7. Mitral valve prolapse: seen on echo (9/13). MARCO results pending.   8. Hepatic cysts: Large cyst identified at junction of right and left lobes & smaller cyst in right lobe seen on CT abdomen. U/S to determine if cystic or solid. AST (20), ALT(15), ALP(52), total bilirubin(0.7) are normal.   9. Renal cyst:  Exophytic left cyst (4.3cm) were identified on CT abdomen.  U/S to determine if cystic or solid.   10. Moderate colonic diverticulosis: seen on CT abdomen. Currently stable, there is no abdominal pain or evidence of diverticulitis.

## 2017-09-14 NOTE — PROGRESS NOTES
Pt anxious in bed,on tele with SR,awaiting for MARCO,kept npo,poc explained,pt updated with the scheduled time for MARCO,pt wants nicotine patch,his patch fell off at night,pt visited by cardiologist,pt c/o constipation,will try MOM.

## 2017-09-15 LAB
BACTERIA UR CULT: NORMAL
SIGNIFICANT IND 70042: NORMAL
SITE SITE: NORMAL
SOURCE SOURCE: NORMAL

## 2017-09-15 NOTE — PROGRESS NOTES
MD visited pt,for discharge, IV D/C'd. Discharge instructions provided to pt. Pt states understanding. Pt states all questions have been answered. Copy of discharge provided to pt. Signed copy in chart. Prescriptions provided to pt, copy in chart.CD collected and gave to pt. Pt states that all personal belongings are in possession. Pt escorted off unit without incident

## 2017-09-18 LAB — LV EJECT FRACT  99904: 65

## 2017-11-19 LAB — EKG IMPRESSION: NORMAL
